# Patient Record
Sex: MALE | Race: WHITE | NOT HISPANIC OR LATINO | Employment: FULL TIME | URBAN - METROPOLITAN AREA
[De-identification: names, ages, dates, MRNs, and addresses within clinical notes are randomized per-mention and may not be internally consistent; named-entity substitution may affect disease eponyms.]

---

## 2022-11-19 PROBLEM — E66.812 CLASS 2 SEVERE OBESITY DUE TO EXCESS CALORIES WITH SERIOUS COMORBIDITY AND BODY MASS INDEX (BMI) OF 36.0 TO 36.9 IN ADULT: Status: ACTIVE | Noted: 2022-11-19

## 2024-09-23 ENCOUNTER — TELEMEDICINE (OUTPATIENT)
Dept: BEHAVIORAL HEALTH | Facility: CLINIC | Age: 29
End: 2024-09-23
Payer: COMMERCIAL

## 2024-09-23 DIAGNOSIS — F84.5 ASPERGER SYNDROME: ICD-10-CM

## 2024-09-23 DIAGNOSIS — F33.1 MDD (MAJOR DEPRESSIVE DISORDER), RECURRENT EPISODE, MODERATE: Primary | ICD-10-CM

## 2024-09-23 DIAGNOSIS — R41.840 CONCENTRATION DEFICIT: ICD-10-CM

## 2024-09-23 PROCEDURE — 96127 BRIEF EMOTIONAL/BEHAV ASSMT: CPT

## 2024-09-23 PROCEDURE — 99214 OFFICE O/P EST MOD 30 MIN: CPT

## 2024-09-23 RX ORDER — BUPROPION HYDROCHLORIDE 150 MG/1
150 TABLET ORAL EVERY MORNING
Qty: 3 TABLET | Refills: 0 | Status: SHIPPED | OUTPATIENT
Start: 2024-09-23 | End: 2024-09-26

## 2024-09-23 RX ORDER — BUPROPION HYDROCHLORIDE 300 MG/1
300 TABLET ORAL EVERY MORNING
Qty: 42 TABLET | Refills: 0 | Status: SHIPPED | OUTPATIENT
Start: 2024-09-23

## 2024-10-21 ENCOUNTER — OFFICE VISIT (OUTPATIENT)
Dept: FAMILY MEDICINE CLINIC | Facility: CLINIC | Age: 29
End: 2024-10-21
Payer: COMMERCIAL

## 2024-10-21 VITALS
RESPIRATION RATE: 18 BRPM | WEIGHT: 254.1 LBS | DIASTOLIC BLOOD PRESSURE: 82 MMHG | SYSTOLIC BLOOD PRESSURE: 130 MMHG | HEIGHT: 69 IN | HEART RATE: 67 BPM | BODY MASS INDEX: 37.64 KG/M2 | OXYGEN SATURATION: 96 %

## 2024-10-21 DIAGNOSIS — E66.812 CLASS 2 SEVERE OBESITY DUE TO EXCESS CALORIES WITH SERIOUS COMORBIDITY AND BODY MASS INDEX (BMI) OF 36.0 TO 36.9 IN ADULT: ICD-10-CM

## 2024-10-21 DIAGNOSIS — B35.3 ATHLETE'S FOOT ON LEFT: Primary | ICD-10-CM

## 2024-10-21 DIAGNOSIS — E66.01 CLASS 2 SEVERE OBESITY DUE TO EXCESS CALORIES WITH SERIOUS COMORBIDITY AND BODY MASS INDEX (BMI) OF 36.0 TO 36.9 IN ADULT: ICD-10-CM

## 2024-10-21 DIAGNOSIS — G47.33 OSA ON CPAP: ICD-10-CM

## 2024-10-21 RX ORDER — TRIAMCINOLONE ACETONIDE 1 MG/G
1 OINTMENT TOPICAL 2 TIMES DAILY
Qty: 15 G | Refills: 1 | Status: SHIPPED | OUTPATIENT
Start: 2024-10-21

## 2024-10-21 RX ORDER — KETOCONAZOLE 20 MG/G
1 CREAM TOPICAL DAILY
Qty: 15 G | Refills: 1 | Status: SHIPPED | OUTPATIENT
Start: 2024-10-21
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
## 2024-10-21 NOTE — ASSESSMENT & PLAN NOTE
Patient's (Body mass index is 37.52 kg/m².) indicates that they are morbidly/severely obese (BMI > 40 or > 35 with obesity - related health condition) with health conditions that include obstructive sleep apnea . Weight is unchanged. BMI  is above average; BMI management plan is completed. We discussed portion control, increasing exercise, joining a fitness center or start home based exercise program, and an abisai-based approach such as [employer] or Lose It.

## 2024-10-24 ENCOUNTER — TELEPHONE (OUTPATIENT)
Dept: FAMILY MEDICINE CLINIC | Facility: CLINIC | Age: 29
End: 2024-10-24

## 2024-10-24 NOTE — TELEPHONE ENCOUNTER
Pt taking wellbutrin, will be filled as normal by covering provider until pt can get an appointment. Pt aware

## 2024-11-03 DIAGNOSIS — F33.1 MDD (MAJOR DEPRESSIVE DISORDER), RECURRENT EPISODE, MODERATE: ICD-10-CM

## 2024-11-03 DIAGNOSIS — R41.840 CONCENTRATION DEFICIT: ICD-10-CM

## 2024-11-03 DIAGNOSIS — F84.5 ASPERGER SYNDROME: ICD-10-CM

## 2024-11-04 NOTE — TELEPHONE ENCOUNTER
LAST REFILL - 09/23/24  LAST VISIT - 09/23/24  NEXT VISIT - not scheduled (upcoming appt with new psych 11/15/24)    Routing to covering provider Dr. Headley due to Sachin Reinoso offboarding     3

## 2024-11-05 RX ORDER — BUPROPION HYDROCHLORIDE 300 MG/1
300 TABLET ORAL EVERY MORNING
Qty: 30 TABLET | Refills: 0 | Status: SHIPPED | OUTPATIENT
Start: 2024-11-05

## 2024-11-15 ENCOUNTER — OFFICE VISIT (OUTPATIENT)
Age: 29
End: 2024-11-15
Payer: COMMERCIAL

## 2024-11-15 VITALS
DIASTOLIC BLOOD PRESSURE: 80 MMHG | HEIGHT: 69 IN | OXYGEN SATURATION: 95 % | BODY MASS INDEX: 37.12 KG/M2 | SYSTOLIC BLOOD PRESSURE: 119 MMHG | HEART RATE: 77 BPM | WEIGHT: 250.6 LBS

## 2024-11-15 DIAGNOSIS — F39 MOOD DISORDER: Primary | ICD-10-CM

## 2024-11-15 DIAGNOSIS — F41.1 GAD (GENERALIZED ANXIETY DISORDER): ICD-10-CM

## 2024-11-15 DIAGNOSIS — F43.10 POST TRAUMATIC STRESS DISORDER (PTSD): ICD-10-CM

## 2024-11-15 DIAGNOSIS — F84.5 ASPERGER SYNDROME: ICD-10-CM

## 2024-11-15 DIAGNOSIS — R41.840 CONCENTRATION DEFICIT: ICD-10-CM
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
## 2024-12-10 ENCOUNTER — OFFICE VISIT (OUTPATIENT)
Age: 29
End: 2024-12-10
Payer: COMMERCIAL

## 2024-12-10 VITALS
BODY MASS INDEX: 37.47 KG/M2 | HEIGHT: 69 IN | OXYGEN SATURATION: 83 % | DIASTOLIC BLOOD PRESSURE: 82 MMHG | WEIGHT: 253 LBS | HEART RATE: 83 BPM | SYSTOLIC BLOOD PRESSURE: 131 MMHG

## 2024-12-10 DIAGNOSIS — F31.30 BIPOLAR I DISORDER, MOST RECENT EPISODE DEPRESSED: Primary | ICD-10-CM
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
## 2024-12-17 ENCOUNTER — OFFICE VISIT (OUTPATIENT)
Age: 29
End: 2024-12-17
Payer: COMMERCIAL

## 2024-12-17 VITALS
WEIGHT: 254 LBS | DIASTOLIC BLOOD PRESSURE: 74 MMHG | SYSTOLIC BLOOD PRESSURE: 121 MMHG | HEART RATE: 89 BPM | BODY MASS INDEX: 37.62 KG/M2 | OXYGEN SATURATION: 96 % | HEIGHT: 69 IN

## 2024-12-17 DIAGNOSIS — F51.05 INSOMNIA DUE TO OTHER MENTAL DISORDER: ICD-10-CM

## 2024-12-17 DIAGNOSIS — F31.30 BIPOLAR I DISORDER, MOST RECENT EPISODE DEPRESSED: Primary | ICD-10-CM

## 2024-12-17 DIAGNOSIS — F99 INSOMNIA DUE TO OTHER MENTAL DISORDER: ICD-10-CM

## 2024-12-17 RX ORDER — LUMATEPERONE 42 MG/1
1 CAPSULE ORAL
Qty: 21 CAPSULE | Refills: 0 | COMMUNITY
Start: 2024-12-17 | End: 2025-01-07
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
## 2024-12-21 ENCOUNTER — TELEMEDICINE (OUTPATIENT)
Dept: FAMILY MEDICINE CLINIC | Facility: TELEHEALTH | Age: 29
End: 2024-12-21
Payer: COMMERCIAL

## 2024-12-21 DIAGNOSIS — J32.9 SINUSITIS, UNSPECIFIED CHRONICITY, UNSPECIFIED LOCATION: Primary | ICD-10-CM

## 2024-12-21 NOTE — PROGRESS NOTES
You have chosen to receive care through a telehealth visit.  Do you consent to use a video/audio connection for your medical care today? Yes     HPI  History of Present Illness  29 year old male with 3 weeks h/o chest congestion, cough, sinus pressure and ear fullness. He reports over the course of 3 weeks he has had a steroid shot and Amoxicillin and was feeling better but he stopped taking the Amoxicillin when he lost the medication. Now he is worse than he was before and is requesting a new script. He is taking OTC medications for cough.     Review of Systems   Constitutional:  Negative for chills, fatigue and fever.   HENT:  Positive for congestion, ear pain (bilateral ear fullness), postnasal drip, rhinorrhea, sinus pressure and sinus pain.    Respiratory:  Positive for cough.    Cardiovascular: Negative.    Gastrointestinal: Negative.    Musculoskeletal: Negative.    Skin: Negative.    Neurological: Negative.    Hematological: Negative.        Past Medical History:   Diagnosis Date    ADHD (attention deficit hyperactivity disorder) 2001    Anxiety 2006    Asperger syndrome     Age 18    Depression 2006    H/O psychiatric hospitalization     [facility]    History of medical problems Trichotillomania, pilonidal cyst, aspbergers    JHOAN (obstructive sleep apnea)     PTSD (post-traumatic stress disorder)     Suicide attempt     Age 12 & 16       Family History   Problem Relation Age of Onset    Depression Mother         estranged    Anxiety disorder Mother     Alcohol abuse Mother     Depression Father     Anxiety disorder Father        Social History     Socioeconomic History    Marital status:      Spouse name: Rogelio    Number of children: 0    Highest education level: GED or equivalent   Tobacco Use    Smoking status: Never    Smokeless tobacco: Never   Vaping Use    Vaping status: Never Used   Substance and Sexual Activity    Alcohol use: Not Currently     Alcohol/week: 1.0 standard  drink of alcohol     Types: 1 Standard drinks or equivalent per week     Comment: SOICALLY    Drug use: Never    Sexual activity: Yes     Partners: Male     Birth control/protection: Partner of same sex         There were no vitals taken for this visit.    PHYSICAL EXAM  Physical Exam   Constitutional: He is oriented to person, place, and time. He appears well-developed and well-nourished. He does not have a sickly appearance. He does not appear ill. No distress.   HENT:   Head: Normocephalic and atraumatic.   Right Ear: Hearing normal.   Left Ear: Hearing normal.   Nose: Rhinorrhea and congestion present. Right sinus exhibits maxillary sinus tenderness. Right sinus exhibits no frontal sinus tenderness. Left sinus exhibits maxillary sinus tenderness. Left sinus exhibits no frontal sinus tenderness.   Mouth/Throat: Mouth/Lips are normal.  Pulmonary/Chest: Effort normal.  No respiratory distress.  Neurological: He is alert and oriented to person, place, and time.   Psychiatric: He has a normal mood and affect.   Vitals reviewed.    Physical Exam      Diagnoses and all orders for this visit:    1. Sinusitis, unspecified chronicity, unspecified location (Primary)  -     amoxicillin-clavulanate (AUGMENTIN) 875-125 MG per tablet; Take 1 tablet by mouth 2 (Two) Times a Day.  Dispense: 20 tablet; Refill: 0      Assessment & Plan  Increase fluids and rest  Flonase nasal spray as directed prn nasal congestion  Mucinex  mg po every 12 hours prn cough and congestion.       FOLLOW-UP  As discussed during visit with [facility], if symptoms worsen or fail to improve, follow-up with PCP/Urgent Care/Emergency Department.    Patient verbalizes understanding of medications, instructions for treatment and follow-up.    Patient or patient representative verbalized consent for the use of Ambient Listening during the visit with  DAVID Cid for chart documentation. 12/21/2024  18:50 EST    Beatrice Saul  APRN  12/21/2024  18:49 EST    The use of a video visit has been reviewed with the patient and verbal informed consent has been obtained. Myself and Anurag Esparza participated in this visit. The patient is located in [place], [place] and I am located in [place], [place]. [employer] and [employer]  were utilized.

## 2025-01-07 DIAGNOSIS — F31.30 BIPOLAR I DISORDER, MOST RECENT EPISODE DEPRESSED: ICD-10-CM

## 2025-01-07 DIAGNOSIS — F51.05 INSOMNIA DUE TO OTHER MENTAL DISORDER: ICD-10-CM

## 2025-01-07 DIAGNOSIS — F99 INSOMNIA DUE TO OTHER MENTAL DISORDER: ICD-10-CM

## 2025-01-07 RX ORDER — LUMATEPERONE 42 MG/1
1 CAPSULE ORAL
Qty: 21 CAPSULE | Refills: 0 | Status: CANCELLED | COMMUNITY
Start: 2025-01-07 | End: 2025-01-28

## 2025-01-07 NOTE — TELEPHONE ENCOUNTER
Rx Refill Note  Requested Prescriptions     Pending Prescriptions Disp Refills    Lumateperone Tosylate (Caplyta) 42 MG capsule 21 capsule 0     Sig: Take 1 capsule by mouth every night at bedtime for 21 days. Lot # [id number]      Last office visit with prescribing clinician: 12/17/2024   Last telemedicine visit with prescribing clinician: Visit date not found   Next office visit with prescribing clinician: 1/14/2025     Armand Mcgowan MA  01/07/25, 10:33 EST

## 2025-01-08 DIAGNOSIS — F99 INSOMNIA DUE TO OTHER MENTAL DISORDER: ICD-10-CM

## 2025-01-08 DIAGNOSIS — F31.30 BIPOLAR I DISORDER, MOST RECENT EPISODE DEPRESSED: ICD-10-CM

## 2025-01-08 DIAGNOSIS — F51.05 INSOMNIA DUE TO OTHER MENTAL DISORDER: ICD-10-CM

## 2025-01-08 RX ORDER — LUMATEPERONE 42 MG/1
1 CAPSULE ORAL
Qty: 30 CAPSULE | Refills: 2 | Status: SHIPPED | OUTPATIENT
Start: 2025-01-08 | End: 2025-04-08

## 2025-01-21 ENCOUNTER — OFFICE VISIT (OUTPATIENT)
Age: 30
End: 2025-01-21
Payer: COMMERCIAL

## 2025-01-21 VITALS
SYSTOLIC BLOOD PRESSURE: 124 MMHG | HEART RATE: 75 BPM | HEIGHT: 69 IN | OXYGEN SATURATION: 92 % | BODY MASS INDEX: 37.33 KG/M2 | WEIGHT: 252 LBS | DIASTOLIC BLOOD PRESSURE: 87 MMHG

## 2025-01-21 DIAGNOSIS — F31.30 BIPOLAR I DISORDER, MOST RECENT EPISODE DEPRESSED: Primary | ICD-10-CM

## 2025-01-21 DIAGNOSIS — F99 INSOMNIA DUE TO OTHER MENTAL DISORDER: ICD-10-CM

## 2025-01-21 DIAGNOSIS — F41.1 GAD (GENERALIZED ANXIETY DISORDER): ICD-10-CM

## 2025-01-21 DIAGNOSIS — F43.10 POST TRAUMATIC STRESS DISORDER (PTSD): ICD-10-CM

## 2025-01-21 DIAGNOSIS — F51.05 INSOMNIA DUE TO OTHER MENTAL DISORDER: ICD-10-CM

## 2025-01-21 RX ORDER — LUMATEPERONE 42 MG/1
1 CAPSULE ORAL
Qty: 21 CAPSULE | Refills: 0 | COMMUNITY
Start: 2025-01-21 | End: 2025-02-11

## 2025-01-21 NOTE — PROGRESS NOTES
"     Office  Follow Up Visit      Patient Name: Anurag Esparza  : 1995   MRN: [id number]     Referring Provider: Karen Ibarra APRN    Chief Complaint:  \"management of bipolar disorder, insomnia, anxiety, and PTSD\"      ICD-10-CM ICD-9-CM   1. Bipolar I disorder, most recent episode depressed  F31.30 296.50   2. Insomnia due to other mental disorder  F51.05 300.9    F99 327.02   3. ALICIA (generalized anxiety disorder)  F41.1 300.02   4. Post traumatic stress disorder (PTSD)  F43.10 309.81        History of Present Illness:   Anurag Esparza is a 29 y.o. male presenting for a routine follow up appointment for psychiatric medication management. Patient reports discontinuation of Caplyta for the past two weeks due to insurance issues, compounded by a snowstorm and communication delays with the office. Prior to discontinuation, he had been taking Caplyta for three weeks and noted positive effects including improved mood, feeling more , better sleep patterns, increased attentiveness, and reduced irritability. Patient experienced difficulty obtaining a refill due to insurance requiring prior authorization, resulting in a lapse in medication.     Patient reports irregular sleep patterns. On weekends, bedtime is typically 2-3 AM. When taking Caplyta, he attempts to take it around 9 PM and be in bed by 10:30-11 PM, unless caffeine has been consumed, which interferes with sleep. Without medication, patient reports going to bed around 2-3 AM, waking at 9 AM, then returning to sleep until 11 AM-12 PM. Patient notes difficulty falling asleep.    Subjective      Review of Systems:   Ears, Nose, Mouth, & Throat: Denies difficulty hearing, smelling, sinus problems, sore throat, or dentition.  Heart/Vascular: Denies rapid heart rate, chest pain, swelling of feet or legs   Respiratory: Denies shortness of breath, cough, or wheeze  GI/: Denies nausea, vomiting, constipation, diarrhea, abdominal pain, painful " urination, frequent urination  MSK: Denies swelling or joint deformities  Skin: Denies lesions or rash  Neurologic: Denies headaches, dizziness, or tremor   Endocrine: Denies heat or cold intolerance   Hematologic: Denies easy bruising or bleeding  Psychiatric: Endorses insomnia, irritability, depression, anxiety; denies recurrent bad thoughts, mood swings, hallucinations, compulsions    PHQ-9 Depression Screening  Little interest or pleasure in doing things?     Feeling down, depressed, or hopeless?     PHQ-2 Total Score     Trouble falling or staying asleep, or sleeping too much?     Feeling tired or having little energy?     Poor appetite or overeating?     Feeling bad about yourself - or that you are a failure or have let yourself or your family down?     Trouble concentrating on things, such as reading the newspaper or watching television?     Moving or speaking so slowly that other people could have noticed? Or the opposite - being so fidgety or restless that you have been moving around a lot more than usual?     Thoughts that you would be better off dead, or of hurting yourself in some way?     PHQ-9 Total Score     If you checked off any problems, how difficult have these problems made it for you to do your work, take care of things at home, or get along with other people?           ALICIA-7           Patient History:   The following portions of the patient's history were reviewed and updated as appropriate: allergies, current medications, past family history, past medical history, past social history, past surgical history and problem list.     Social History     Socioeconomic History    Marital status:      Spouse name: Rogelio    Number of children: 0    Highest education level: GED or equivalent   Tobacco Use    Smoking status: Never    Smokeless tobacco: Never   Vaping Use    Vaping status: Never Used   Substance and Sexual Activity    Alcohol use: Not Currently     Alcohol/week: 1.0 standard drink  "of alcohol     Types: 1 Standard drinks or equivalent per week     Comment: SOICALLY    Drug use: Never    Sexual activity: Yes     Partners: Male     Birth control/protection: Partner of same sex       Family History   Problem Relation Age of Onset    Depression Mother         estranged    Anxiety disorder Mother     Alcohol abuse Mother     Depression Father     Anxiety disorder Father        Medications:     Current Outpatient Medications:     Lumateperone Tosylate (Caplyta) 42 MG capsule, Take 1 capsule by mouth every night at bedtime for 21 days. Lot # [id number] EXP: 3/2027, Disp: 21 capsule, Rfl: 0    amoxicillin-clavulanate (AUGMENTIN) 875-125 MG per tablet, Take 1 tablet by mouth 2 (Two) Times a Day., Disp: 20 tablet, Rfl: 0    ketoconazole (NIZORAL) 2 % cream, Apply 1 Application topically to the appropriate area as directed Daily., Disp: 15 g, Rfl: 1    triamcinolone (KENALOG) 0.1 % ointment, Apply 1 Application topically to the appropriate area as directed 2 (Two) Times a Day., Disp: 15 g, Rfl: 1    Objective     Physical Exam:  Vital Signs:   Vitals:    01/21/25 0831   BP: 124/87   Pulse: 75   SpO2: 92%   Weight: 114 kg (252 lb)   Height: 175.3 cm (69.02\")     Body mass index is 37.2 kg/m².     Mental Status Exam:   The patient was alert and oriented to time, place, person, and situation. Neatly groomed and dressed. Good eye contact. Cooperative and answers questions willingly. No evidence of gait imbalance or shuffling. Normal speech rate, rhythm, and tone. Reports depressed mood. Affect congruent with mood. Denies SI/HI/AVH. Organized thought process. No evidence of delusional thinking.  Fair insight, fair judgement. Impulse control intact. Memory intact with average to above average intellectual functioning.     No Known Allergies     Current Medications:   Current Outpatient Medications   Medication Sig Dispense Refill    Lumateperone Tosylate (Caplyta) 42 MG capsule Take 1 capsule by mouth every night " at bedtime for 21 days. Lot # [id number]  EXP: 3/2027 21 capsule 0    amoxicillin-clavulanate (AUGMENTIN) 875-125 MG per tablet Take 1 tablet by mouth 2 (Two) Times a Day. 20 tablet 0    ketoconazole (NIZORAL) 2 % cream Apply 1 Application topically to the appropriate area as directed Daily. 15 g 1    triamcinolone (KENALOG) 0.1 % ointment Apply 1 Application topically to the appropriate area as directed 2 (Two) Times a Day. 15 g 1     No current facility-administered medications for this visit.       @RESULASTCBCDIFFPANEL,TSH,LABLIPI,[id number],IWQRUPNY01,MG,FOLATE,PROLACTIN,CRPRESULT,CMP,[id number])@    Lab Results   Component Value Date    GLUCOSE 93 03/04/2024    BUN 13 03/04/2024    CREATININE 0.82 03/04/2024    EGFRRESULT 122.7 03/04/2024    BCR 15.9 03/04/2024    K 4.4 03/04/2024    CO2 24.5 03/04/2024    CALCIUM 8.9 03/04/2024    PROTENTOTREF 6.5 03/04/2024    ALBUMIN 4.5 03/04/2024    BILITOT 0.5 03/04/2024    AST 21 03/04/2024    ALT 26 03/04/2024       Lab Results   Component Value Date    WBC 10.22 03/04/2024    HGB 14.5 03/04/2024    HCT 43.9 03/04/2024    MCV 85.7 03/04/2024     03/04/2024       Lab Results   Component Value Date    CHLPL 202 (H) 03/04/2024    TRIG 129 03/04/2024    HDL 39 (L) 03/04/2024     (H) 03/04/2024                  Assessment / Plan      Impression/Treatment Plan:  The patient reported discontinuation of Caplyta for the past two weeks due to insurance issues, which led to a lapse in medication. Prior to discontinuation, he experienced positive effects from Caplyta, including improved mood, sleep, and attentiveness. He also reported irregular sleep patterns, increased stress due to their pet's health issues, and overeating as a coping mechanism. The plan includes providing Caplyta samples, working on prior authorization, encouraging a consistent sleep schedule, and addressing stress. A follow-up appointment is scheduled in two weeks.    1.  Bipolar disorder     -  Patient reported positive effects from Caplyta, including improved mood, sleep, and attentiveness.     - Patient experienced a gap in medication due to insurance issues and prior authorization requirements.     Plan:         a. Provide patient with 3 weeks' worth of Caplyta samples.         b. Work on obtaining prior authorization for Caplyta from the patient's insurance ([employer]).         c. Provide patient with a coupon card for Caplyta to reduce out-of-pocket costs.         d. Schedule a follow-up appointment in 2 weeks to assess the patient's progress on Caplyta.    2. Sleep disturbances     - Patient reports difficulty falling asleep and inconsistent sleep schedule, exacerbated by caffeine consumption.     Plan:         a. Encourage patient to maintain a consistent sleep schedule and bedtime routine.         b. Advise patient to avoid caffeine close to bedtime.         c. Monitor sleep quality during the follow-up appointment in 2 weeks.    3.  Anxiety     - Patient reports increased stress due to pet's health issues, requiring multiple vet visits, and disruption of daily routine.     Plan:         a. Encourage patient to engage in stress-reducing activities, such as exercise, meditation, or hobbies.         b. Assess patient's stress levels during the follow-up appointment in 2 weeks.        Diagnoses and all orders for this visit:    1. Bipolar I disorder, most recent episode depressed (Primary)  -     Lumateperone Tosylate (Caplyta) 42 MG capsule; Take 1 capsule by mouth every night at bedtime for 21 days. Lot # [id number]  EXP: 3/2027  Dispense: 21 capsule; Refill: 0    2. Insomnia due to other mental disorder  -     Lumateperone Tosylate (Caplyta) 42 MG capsule; Take 1 capsule by mouth every night at bedtime for 21 days. Lot # [id number]  EXP: 3/2027  Dispense: 21 capsule; Refill: 0    3. ALICIA (generalized anxiety disorder)  -     Lumateperone Tosylate (Caplyta) 42 MG capsule; Take 1 capsule by mouth  every night at bedtime for 21 days. Lot # [id number]  EXP: 3/2027  Dispense: 21 capsule; Refill: 0    4. Post traumatic stress disorder (PTSD)  -     Lumateperone Tosylate (Caplyta) 42 MG capsule; Take 1 capsule by mouth every night at bedtime for 21 days. Lot # [id number]  EXP: 3/2027  Dispense: 21 capsule; Refill: 0        MEDS ORDERED DURING VISIT:  New Medications Ordered This Visit   Medications    Lumateperone Tosylate (Caplyta) 42 MG capsule     Sig: Take 1 capsule by mouth every night at bedtime for 21 days. Lot # [id number]  EXP: 3/2027     Dispense:  21 capsule     Refill:  0     Order Specific Question:   Lot Number?     Answer:   [id number]     Order Specific Question:   Expiration Date?     Answer:   1/20/2027     Order Specific Question:   Quantity     Answer:   10       Follow up with Yulia Figueroa, PAIGE, APRN, PMERNAP-BC in this office in 2 weeks.          PATIENT EDUCATION:  Discussed medication options and treatment plan of prescribed medication(s) as well as the risks, benefits, and potential side effects. Patient is agreeable to call the office with any worsening of symptoms or onset of side effects. Patient is agreeable to call 911 or go to the nearest ER should he/she begin having SI/HI.    SAFETY PLAN:  Patient was given ample time for questions and fully participated in treatment planning.  Patient was encouraged to call the clinic with any questions or concerns.  Patient was informed of access to emergency care. If patient were to develop any significant symptomatology, suicidal ideation, homicidal ideation, any concerns, or feel unsafe at any time they are to call the clinic and if unable to get immediate assistance should immediately call 911 or go to the nearest emergency room.  The patient is advised to remove or secure (lock away) all lethal weapons (including guns) and sharps (including razors, scissors, knives, etc.).  All medications (including any prescribed and any over the counter medications)  should be stored in a safe and secured location that is not obtainable by children/adolescents.  Patient was given an opportunity and encouraged to ask questions about their medication, illness, and treatment. Patient contracted verbally for the following: If you are experiencing an emotional crisis or have thoughts of harming yourself or others, please go to your nearest local emergency room or call 911. Will continue to re-assess medication response and side effects frequently to establish efficacy and ensure safety. Risks, any black box warnings, side effects, off label usage, and benefits of medication and treatment discussed with patient, along with potential adverse side effects of current and/or newly prescribed medication, alternative treatment options, and OTC medications.  Patient verbalized understanding of potential risks, any off label use of medication, any black box warnings, and any side effects in their own words. The patient verbalized understanding and agreed to comply with the safety plan discussed in their own words.  Patient given the number to the office. Number also available to the 24- hour suicide hotline.     Short Term Goals: Continue building rapport with the patient. Patient will be compliant with medication, and patient will have no significant medication related side effects.  Patient will be engaged in psychotherapy as indicated.  Patient will report subjective improvement of symptoms.     Long term goals: To stabilize mood and treat/improve subjective symptoms, the patient will stay out of the hospital, the patient will be at an optimal level of functioning, and the patient will take all medications as prescribed.     Poli reviewed and is appropriate.    Copied text in this note has been reviewed and is accurate as of 1/21/2025.    Part of this note may be an electronic transcription/translation of spoken language to printed text using the Dragon Dictation System.    Yulia  Henry, PAIGE, APRN, PMHNP-BC

## 2025-02-11 ENCOUNTER — OFFICE VISIT (OUTPATIENT)
Age: 30
End: 2025-02-11
Payer: COMMERCIAL

## 2025-02-11 VITALS
HEIGHT: 69 IN | WEIGHT: 250 LBS | BODY MASS INDEX: 37.03 KG/M2 | DIASTOLIC BLOOD PRESSURE: 81 MMHG | SYSTOLIC BLOOD PRESSURE: 126 MMHG | HEART RATE: 74 BPM | OXYGEN SATURATION: 92 %

## 2025-02-11 DIAGNOSIS — R41.840 CONCENTRATION DEFICIT: ICD-10-CM

## 2025-02-11 DIAGNOSIS — F43.10 POST TRAUMATIC STRESS DISORDER (PTSD): ICD-10-CM

## 2025-02-11 DIAGNOSIS — F84.5 ASPERGER SYNDROME: ICD-10-CM

## 2025-02-11 DIAGNOSIS — F31.30 BIPOLAR I DISORDER, MOST RECENT EPISODE DEPRESSED: ICD-10-CM

## 2025-02-11 DIAGNOSIS — F41.1 GAD (GENERALIZED ANXIETY DISORDER): Primary | ICD-10-CM

## 2025-02-11 DIAGNOSIS — F99 INSOMNIA DUE TO OTHER MENTAL DISORDER: ICD-10-CM

## 2025-02-11 DIAGNOSIS — F51.05 INSOMNIA DUE TO OTHER MENTAL DISORDER: ICD-10-CM

## 2025-02-11 RX ORDER — LUMATEPERONE 42 MG/1
1 CAPSULE ORAL
Qty: 30 CAPSULE | Refills: 2 | Status: SHIPPED | OUTPATIENT
Start: 2025-02-11 | End: 2025-05-12

## 2025-02-11 NOTE — PROGRESS NOTES
"     Office  Follow Up Visit      Patient Name: Anurag Esparza  : 1995   MRN: [id number]     Referring Provider: Karen Ibarra APRN    Chief Complaint: \"Management of mood disorder, generalized anxiety disorder, PTSD, insomnia, ADHD\"    ICD-10-CM ICD-9-CM   1. ALICIA (generalized anxiety disorder)  F41.1 300.02   2. Post traumatic stress disorder (PTSD)  F43.10 309.81   3. Asperger syndrome  F84.5 299.80   4. Concentration deficit  R41.840 799.51   5. Insomnia due to other mental disorder  F51.05 300.9    F99 327.02   6. Bipolar I disorder, most recent episode depressed  F31.30 296.50        History of Present Illness:   Anurag Esparza is a 29 y.o. male presenting for a routine follow up appointment for psychiatric medication management.        The patient is a 29-year-old male presenting today for a routine follow-up appointment for psychiatric medication management.    He reports a positive response to Caplyta, which he has been taking for the past 3 weeks. However, he has experienced an increase in appetite, leading to a weight gain of approximately 5 to 10 pounds. He expresses concern about potential anxiety-related overeating and plans to enlist his partner's support in managing his food intake. He also reports frequent urination at night, which disrupts his sleep. He has increased his fluid intake due to dry mouth, a known side effect of Caplyta. He has previously undergone neuropsychological testing, which resulted in diagnoses of Asperger's, ADHD, mood disorder, and anxiety. He has expressed interest in resuming ADHD medication, having previously tried Vyvanse and Adderall, both of which he found beneficial.    MEDICATIONS  Current: Caplyta  Past: Seroquel, aripiprazole, risperidone, sertraline, clonidine,  Wellbutrin, lithium     Subjective      Review of Systems:   Ears, Nose, Mouth, & Throat: Denies difficulty hearing, smelling, sinus problems, sore throat, or dentition.  Heart/Vascular: Denies " rapid heart rate, chest pain, swelling of feet or legs   Respiratory: Denies shortness of breath, cough, or wheeze  GI/: Denies nausea, vomiting, constipation, diarrhea, abdominal pain, painful urination, frequent urination  MSK: Denies swelling or joint deformities  Skin: Denies lesions or rash  Neurologic: Denies headaches, dizziness, or tremor   Endocrine: Denies heat or cold intolerance   Hematologic: Denies easy bruising or bleeding  Psychiatric: Endorses improvement in insomnia, irritability, depression, anxiety; denies recurrent bad thoughts, mood swings, hallucinations, compulsions    PHQ-9 Depression Screening  Little interest or pleasure in doing things? Several days   Feeling down, depressed, or hopeless? Several days   PHQ-2 Total Score 2   Trouble falling or staying asleep, or sleeping too much? Not at all   Feeling tired or having little energy? Over half   Poor appetite or overeating? Almost all   Feeling bad about yourself - or that you are a failure or have let yourself or your family down? Several days   Trouble concentrating on things, such as reading the newspaper or watching television? Almost all   Moving or speaking so slowly that other people could have noticed? Or the opposite - being so fidgety or restless that you have been moving around a lot more than usual? Not at all   Thoughts that you would be better off dead, or of hurting yourself in some way? Not at all   PHQ-9 Total Score 11   If you checked off any problems, how difficult have these problems made it for you to do your work, take care of things at home, or get along with other people? Somewhat difficult         ALICIA-7      Over the last two weeks, how often have you been bothered by the following problems?  Feeling nervous, anxious or on edge: Several days  Not being able to stop or control worrying: Several days  Worrying too much about different things: More than half the days  Trouble Relaxing: More than half the days  Being  so restless that it is hard to sit still: More than half the days  Becoming easily annoyed or irritable: Several days  Feeling afraid as if something awful might happen: Several days  ALICIA 7 Total Score: 10  If you checked any problems, how difficult have these problems made it for you to do your work, take care of things at home, or get along with other people: Somewhat difficult    Patient History:   The following portions of the patient's history were reviewed and updated as appropriate: allergies, current medications, past family history, past medical history, past social history, past surgical history and problem list.     Social History     Socioeconomic History    Marital status:      Spouse name: Rogelio    Number of children: 0    Highest education level: GED or equivalent   Tobacco Use    Smoking status: Never    Smokeless tobacco: Never   Vaping Use    Vaping status: Never Used   Substance and Sexual Activity    Alcohol use: Not Currently     Alcohol/week: 1.0 standard drink of alcohol     Types: 1 Standard drinks or equivalent per week     Comment: SOICALLY    Drug use: Never    Sexual activity: Yes     Partners: Male     Birth control/protection: Partner of same sex       Family History   Problem Relation Age of Onset    Depression Mother         estranged    Anxiety disorder Mother     Alcohol abuse Mother     Depression Father     Anxiety disorder Father        Medications:     Current Outpatient Medications:     Lumateperone Tosylate (Caplyta) 42 MG capsule, Take 1 capsule by mouth every night at bedtime for 90 days. Lot # [id number] EXP: 3/2027, Disp: 30 capsule, Rfl: 2    amoxicillin-clavulanate (AUGMENTIN) 875-125 MG per tablet, Take 1 tablet by mouth 2 (Two) Times a Day., Disp: 20 tablet, Rfl: 0    azithromycin (ZITHROMAX) 250 MG tablet, Take 2 on first day. Take 1 a day for additional 4 days., Disp: 6 tablet, Rfl: 0    benzonatate (TESSALON) 200 MG capsule, Take 1 capsule by mouth 3 (Three)  "Times a Day As Needed for Cough., Disp: 15 capsule, Rfl: 0    ketoconazole (NIZORAL) 2 % cream, Apply 1 Application topically to the appropriate area as directed Daily., Disp: 15 g, Rfl: 1    triamcinolone (KENALOG) 0.1 % ointment, Apply 1 Application topically to the appropriate area as directed 2 (Two) Times a Day., Disp: 15 g, Rfl: 1    Objective     Physical Exam:  Vital Signs:   Vitals:    02/11/25 1324   BP: 126/81   Pulse: 74   SpO2: 92%   Weight: 113 kg (250 lb)   Height: 175.3 cm (69.02\")     Body mass index is 36.9 kg/m².     Mental Status Exam:   The patient was alert and oriented to time, place, person, and situation. Neatly groomed and dressed. Good eye contact. Cooperative and answers questions willingly. No evidence of gait imbalance or shuffling. Normal speech rate, rhythm, and tone. Reports euthymic mood. Affect congruent with mood.  Denies SI/HI/AVH. Organized thought process. No evidence of delusional thinking.  Fair insight, fair judgement. Impulse control intact. Memory intact with average to above average intellectual functioning.     No Known Allergies     Current Medications:   Current Outpatient Medications   Medication Sig Dispense Refill    Lumateperone Tosylate (Caplyta) 42 MG capsule Take 1 capsule by mouth every night at bedtime for 90 days. Lot # [id number] EXP: 3/2027 30 capsule 2    amoxicillin-clavulanate (AUGMENTIN) 875-125 MG per tablet Take 1 tablet by mouth 2 (Two) Times a Day. 20 tablet 0    azithromycin (ZITHROMAX) 250 MG tablet Take 2 on first day. Take 1 a day for additional 4 days. 6 tablet 0    benzonatate (TESSALON) 200 MG capsule Take 1 capsule by mouth 3 (Three) Times a Day As Needed for Cough. 15 capsule 0    ketoconazole (NIZORAL) 2 % cream Apply 1 Application topically to the appropriate area as directed Daily. 15 g 1    triamcinolone (KENALOG) 0.1 % ointment Apply 1 Application topically to the appropriate area as directed 2 (Two) Times a Day. 15 g 1     No current " facility-administered medications for this visit.       @RESULASTCBCDIFFPANEL,TSH,LABLIPI,[id number],[id number],MG,FOLATE,PROLACTIN,CRPRESULT,CMP,[id number])@    Lab Results   Component Value Date    GLUCOSE 93 03/04/2024    BUN 13 03/04/2024    CREATININE 0.82 03/04/2024    EGFRRESULT 122.7 03/04/2024    BCR 15.9 03/04/2024    K 4.4 03/04/2024    CO2 24.5 03/04/2024    CALCIUM 8.9 03/04/2024    PROTENTOTREF 6.5 03/04/2024    ALBUMIN 4.5 03/04/2024    BILITOT 0.5 03/04/2024    AST 21 03/04/2024    ALT 26 03/04/2024       Lab Results   Component Value Date    WBC 10.22 03/04/2024    HGB 14.5 03/04/2024    HCT 43.9 03/04/2024    MCV 85.7 03/04/2024     03/04/2024       Lab Results   Component Value Date    CHLPL 202 (H) 03/04/2024    TRIG 129 03/04/2024    HDL 39 (L) 03/04/2024     (H) 03/04/2024                  Assessment / Plan      Impression/Treatment Plan:       1. Psychiatric medication management.  He reports positive effects from Caplyta but is experiencing increased appetite and weight gain. It was discussed that these side effects might plateau over time. He will continue Caplyta 42 mg daily for another month to monitor if the side effects stabilize. A prescription for Caplyta will be sent to the pharmacy, and prior authorization will be pursued. He is advised to bring his neuropsychological testing report to the next visit or fax to the office sooner for further evaluation and potential initiation of Adderall.    2. Sleep disturbances.  He reports frequent nighttime urination, which may be affecting his sleep. This symptom is not a known side effect of Caplyta. He is advised to follow up with his primary care provider for further evaluation.    Follow-up  The patient will follow up in 1 month.       Diagnoses and all orders for this visit:    1. ALICIA (generalized anxiety disorder) (Primary)  -     Lumateperone Tosylate (Caplyta) 42 MG capsule; Take 1 capsule by mouth every night at bedtime  for 90 days. Lot # [id number] EXP: 3/2027  Dispense: 30 capsule; Refill: 2    2. Post traumatic stress disorder (PTSD)  -     Lumateperone Tosylate (Caplyta) 42 MG capsule; Take 1 capsule by mouth every night at bedtime for 90 days. Lot # [id number] EXP: 3/2027  Dispense: 30 capsule; Refill: 2    3. Asperger syndrome    4. Concentration deficit    5. Insomnia due to other mental disorder  -     Lumateperone Tosylate (Caplyta) 42 MG capsule; Take 1 capsule by mouth every night at bedtime for 90 days. Lot # [id number] EXP: 3/2027  Dispense: 30 capsule; Refill: 2    6. Bipolar I disorder, most recent episode depressed  -     Lumateperone Tosylate (Caplyta) 42 MG capsule; Take 1 capsule by mouth every night at bedtime for 90 days. Lot # [id number] EXP: 3/2027  Dispense: 30 capsule; Refill: 2        MEDS ORDERED DURING VISIT:  New Medications Ordered This Visit   Medications    Lumateperone Tosylate (Caplyta) 42 MG capsule     Sig: Take 1 capsule by mouth every night at bedtime for 90 days. Lot # [id number] EXP: 3/2027     Dispense:  30 capsule     Refill:  2     Order Specific Question:   Lot Number?     Answer:   [id number]     Order Specific Question:   Expiration Date?     Answer:   3/1/2027     Order Specific Question:   Quantity     Answer:   4       Follow up with Yulia Figueroa, PAIGE, APRN, PMHNP-BC in this office in 1 month.          PATIENT EDUCATION:  Discussed medication options and treatment plan of prescribed medication(s) as well as the risks, benefits, and potential side effects. Patient is agreeable to call the office with any worsening of symptoms or onset of side effects. Patient is agreeable to call 911 or go to the nearest ER should he/she begin having SI/HI.    SAFETY PLAN:  Patient was given ample time for questions and fully participated in treatment planning.  Patient was encouraged to call the clinic with any questions or concerns.  Patient was informed of access to emergency care. If patient were to develop any  significant symptomatology, suicidal ideation, homicidal ideation, any concerns, or feel unsafe at any time they are to call the clinic and if unable to get immediate assistance should immediately call 911 or go to the nearest emergency room.  The patient is advised to remove or secure (lock away) all lethal weapons (including guns) and sharps (including razors, scissors, knives, etc.).  All medications (including any prescribed and any over the counter medications) should be stored in a safe and secured location that is not obtainable by children/adolescents.  Patient was given an opportunity and encouraged to ask questions about their medication, illness, and treatment. Patient contracted verbally for the following: If you are experiencing an emotional crisis or have thoughts of harming yourself or others, please go to your nearest local emergency room or call 911. Will continue to re-assess medication response and side effects frequently to establish efficacy and ensure safety. Risks, any black box warnings, side effects, off label usage, and benefits of medication and treatment discussed with patient, along with potential adverse side effects of current and/or newly prescribed medication, alternative treatment options, and OTC medications.  Patient verbalized understanding of potential risks, any off label use of medication, any black box warnings, and any side effects in their own words. The patient verbalized understanding and agreed to comply with the safety plan discussed in their own words.  Patient given the number to the office. Number also available to the 24- hour suicide hotline.     Short Term Goals: Continue building rapport with the patient. Patient will be compliant with medication, and patient will have no significant medication related side effects.  Patient will be engaged in psychotherapy as indicated.  Patient will report subjective improvement of symptoms.     Long term goals: To stabilize  mood and treat/improve subjective symptoms, the patient will stay out of the hospital, the patient will be at an optimal level of functioning, and the patient will take all medications as prescribed.     Poli reviewed and is appropriate.    Patient or patient representative verbalized consent for the use of Ambient Listening during the visit with  DAVID Kerns for chart documentation. 2/11/2025  14:55 EST       Copied text in this note has been reviewed and is accurate as of 2/11/2025.    Part of this note may be an electronic transcription/translation of spoken language to printed text using the Dragon Dictation System.    Yulia Figueroa DNP, DAVID, PMHNP-BC

## 2025-03-19 ENCOUNTER — OFFICE VISIT (OUTPATIENT)
Age: 30
End: 2025-03-19
Payer: COMMERCIAL

## 2025-03-19 VITALS
SYSTOLIC BLOOD PRESSURE: 124 MMHG | BODY MASS INDEX: 36.73 KG/M2 | DIASTOLIC BLOOD PRESSURE: 79 MMHG | HEART RATE: 72 BPM | OXYGEN SATURATION: 97 % | WEIGHT: 248 LBS | HEIGHT: 69 IN

## 2025-03-19 DIAGNOSIS — F90.2 ADHD (ATTENTION DEFICIT HYPERACTIVITY DISORDER), COMBINED TYPE: Primary | ICD-10-CM

## 2025-03-19 DIAGNOSIS — Z79.899 HIGH RISK MEDICATION USE: ICD-10-CM

## 2025-03-19 DIAGNOSIS — F41.1 GAD (GENERALIZED ANXIETY DISORDER): ICD-10-CM

## 2025-03-19 DIAGNOSIS — F43.10 POST TRAUMATIC STRESS DISORDER (PTSD): ICD-10-CM

## 2025-03-19 DIAGNOSIS — F31.30 BIPOLAR I DISORDER, MOST RECENT EPISODE DEPRESSED: ICD-10-CM

## 2025-03-19 RX ORDER — LISDEXAMFETAMINE DIMESYLATE 20 MG/1
20 CAPSULE ORAL EVERY MORNING
Qty: 30 CAPSULE | Refills: 0 | Status: SHIPPED | OUTPATIENT
Start: 2025-03-19 | End: 2025-03-20 | Stop reason: SDUPTHER

## 2025-03-19 RX ORDER — LUMATEPERONE 42 MG/1
1 CAPSULE ORAL
Qty: 30 CAPSULE | Refills: 2 | Status: SHIPPED | OUTPATIENT
Start: 2025-03-19 | End: 2025-03-21

## 2025-03-19 NOTE — PROGRESS NOTES
"     Office  Follow Up Visit      Patient Name: Anurag Esparza  : 1995   MRN: [id number]     Referring Provider: Karen Ibarra APRN    Chief Complaint:  \"management of bipolar disorder, ALICIA, PTSD, and ADHD\"      ICD-10-CM ICD-9-CM   1. ADHD (attention deficit hyperactivity disorder), combined type  F90.2 314.01   2. ALICIA (generalized anxiety disorder)  F41.1 300.02   3. Post traumatic stress disorder (PTSD)  F43.10 309.81   4. Bipolar I disorder, most recent episode depressed  F31.30 296.50   5. High risk medication use  Z79.899 V58.69        History of Present Illness:   Anurag Esparza is a 29 y.o. male presenting for a routine follow up appointment for psychiatric medication management.        He expresses satisfaction with Caplyta manging symptoms. He was concerned about potential appetite increase and weight gain but has since adjusted his eating habits and reports a minimal weight increase from 245 to 248 pounds, which he considers acceptable. His mood has generally improved, with increased happiness and less frequent depressive episodes. However, he acknowledges a challenging week that has slightly elevated his PHQ-9/ALICIA-7 scores. He continues to experience anxiety, although it is less severe than before. He is currently working with a therapist to manage this. He reports feeling stable and content with his current medication regimen.    We have previously discussed the possibility of restarting a stimulant for his ADHD symptoms. He recalls undergoing testing in [place] under the supervision of a  but has lost the documentation due to a car break-in. He has contacted [facility] but they do not have any records of his testing. He is open to undergoing testing again. He reports spending excessive time on his phone watching YouTube shorts, often neglecting other responsibilities. He struggles with task completion and lacks motivation, a problem that has been progressively worsening " "over the past 3 to 4 months. This issue extends to his work, where he has several unfinished projects due to procrastination. He recently faced potential disciplinary action at work for failing to rebuild an environment that was due a year ago.  He has previously tried Vyvanse and Adderall, with Adderall being more effective during his younger years.     He reports fewer \"manic episodes\" compared to his childhood, with the severity varying depending on the situation. He describes childhood manic episodes as \"behavioral outbursts or anger outbursts.\" He has a complex history of childhood trauma. His mood has been stable on Caplyta alone. He tolerated stimulants in the past.    MEDICATIONS  Current: Caplyta  Past: Vyvanse, Adderall     Subjective      Review of Systems:   Ears, Nose, Mouth, & Throat: Denies difficulty hearing, smelling, sinus problems, sore throat, or dentition.  Heart/Vascular: Denies rapid heart rate, chest pain, swelling of feet or legs   Respiratory: Denies shortness of breath, cough, or wheeze  GI/: Denies nausea, vomiting, constipation, diarrhea, abdominal pain, painful urination, frequent urination  MSK: Denies swelling or joint deformities  Skin: Denies lesions or rash  Neurologic: Denies headaches, dizziness, or tremor   Endocrine: Denies heat or cold intolerance   Hematologic: Denies easy bruising or bleeding  Psychiatric: Endorses improvement in anxiety; denies insomnia, irritability, depression, recurrent bad thoughts, mood swings, hallucinations, compulsions    PHQ-9 Depression Screening  Little interest or pleasure in doing things? Nearly every day   Feeling down, depressed, or hopeless? More than half the days   PHQ-2 Total Score 5   Trouble falling or staying asleep, or sleeping too much? Not at all   Feeling tired or having little energy? More than half the days   Poor appetite or overeating? More than half the days   Feeling bad about yourself - or that you are a failure or have " let yourself or your family down? More than half the days   Trouble concentrating on things, such as reading the newspaper or watching television? Nearly every day   Moving or speaking so slowly that other people could have noticed? Or the opposite - being so fidgety or restless that you have been moving around a lot more than usual? Not at all     Thoughts that you would be better off dead, or of hurting yourself in some way? Not at all   PHQ-9 Total Score 14   If you checked off any problems, how difficult have these problems made it for you to do your work, take care of things at home, or get along with other people? Very difficult         ALICIA-7      Over the last two weeks, how often have you been bothered by the following problems?  Feeling nervous, anxious or on edge: Several days  Not being able to stop or control worrying: Not at all  Worrying too much about different things: Several days  Trouble Relaxing: Not at all  Being so restless that it is hard to sit still: Several days  Becoming easily annoyed or irritable: More than half the days  Feeling afraid as if something awful might happen: More than half the days  ALICIA 7 Total Score: 7  If you checked any problems, how difficult have these problems made it for you to do your work, take care of things at home, or get along with other people: Somewhat difficult    Patient History:   The following portions of the patient's history were reviewed and updated as appropriate: allergies, current medications, past family history, past medical history, past social history, past surgical history and problem list.     Social History     Socioeconomic History    Marital status:      Spouse name: Rogelio    Number of children: 0    Highest education level: GED or equivalent   Tobacco Use    Smoking status: Never    Smokeless tobacco: Never   Vaping Use    Vaping status: Never Used   Substance and Sexual Activity    Alcohol use: Not Currently     Alcohol/week: 1.0  "standard drink of alcohol     Types: 1 Standard drinks or equivalent per week     Comment: SOICALLY    Drug use: Never    Sexual activity: Yes     Partners: Male     Birth control/protection: Partner of same sex       Family History   Problem Relation Age of Onset    Depression Mother         estranged    Anxiety disorder Mother     Alcohol abuse Mother     Depression Father     Anxiety disorder Father        Medications:     Current Outpatient Medications:     Lumateperone Tosylate (Caplyta) 42 MG capsule, Take 1 capsule by mouth every night at bedtime for 90 days. Lot # [id number] EXP: 3/2027, Disp: 30 capsule, Rfl: 2    amoxicillin-clavulanate (AUGMENTIN) 875-125 MG per tablet, Take 1 tablet by mouth 2 (Two) Times a Day., Disp: 20 tablet, Rfl: 0    azithromycin (ZITHROMAX) 250 MG tablet, Take 2 on first day. Take 1 a day for additional 4 days., Disp: 6 tablet, Rfl: 0    benzonatate (TESSALON) 200 MG capsule, Take 1 capsule by mouth 3 (Three) Times a Day As Needed for Cough., Disp: 15 capsule, Rfl: 0    ketoconazole (NIZORAL) 2 % cream, Apply 1 Application topically to the appropriate area as directed Daily., Disp: 15 g, Rfl: 1    lisdexamfetamine (Vyvanse) 20 MG capsule, Take 1 capsule by mouth Every Morning, Disp: 30 capsule, Rfl: 0    triamcinolone (KENALOG) 0.1 % ointment, Apply 1 Application topically to the appropriate area as directed 2 (Two) Times a Day., Disp: 15 g, Rfl: 1    Objective     Physical Exam:  Vital Signs:   Vitals:    03/19/25 0901   BP: 124/79   Pulse: 72   SpO2: 97%   Weight: 112 kg (248 lb)   Height: 175.3 cm (69.02\")     Body mass index is 36.61 kg/m².     Mental Status Exam:   The patient was alert and oriented to time, place, person, and situation. Neatly groomed and dressed. Good eye contact. Cooperative and answers questions willingly. No evidence of gait imbalance or shuffling. Normal speech rate, rhythm, and tone. Reports good/euthymic mood. Affect congruent with mood. Denies SI/HI/AVH. " Organized thought process. No evidence of delusional thinking. Good insight, good judgement. Impulse control intact. Memory intact with average to above average intellectual functioning.     No Known Allergies     Current Medications:   Current Outpatient Medications   Medication Sig Dispense Refill    Lumateperone Tosylate (Caplyta) 42 MG capsule Take 1 capsule by mouth every night at bedtime for 90 days. Lot # [id number] EXP: 3/2027 30 capsule 2    amoxicillin-clavulanate (AUGMENTIN) 875-125 MG per tablet Take 1 tablet by mouth 2 (Two) Times a Day. 20 tablet 0    azithromycin (ZITHROMAX) 250 MG tablet Take 2 on first day. Take 1 a day for additional 4 days. 6 tablet 0    benzonatate (TESSALON) 200 MG capsule Take 1 capsule by mouth 3 (Three) Times a Day As Needed for Cough. 15 capsule 0    ketoconazole (NIZORAL) 2 % cream Apply 1 Application topically to the appropriate area as directed Daily. 15 g 1    lisdexamfetamine (Vyvanse) 20 MG capsule Take 1 capsule by mouth Every Morning 30 capsule 0    triamcinolone (KENALOG) 0.1 % ointment Apply 1 Application topically to the appropriate area as directed 2 (Two) Times a Day. 15 g 1     No current facility-administered medications for this visit.       @RESULASTCBCDIFFPANEL,TSH,LABLIPI,UZAPYRKM94,KXPEZJUY33,MG,FOLATE,PROLACTIN,CRPRESULT,CMP,[id number])@    Lab Results   Component Value Date    GLUCOSE 93 03/04/2024    BUN 13 03/04/2024    CREATININE 0.82 03/04/2024    EGFR 122.7 03/04/2024    BCR 15.9 03/04/2024    K 4.4 03/04/2024    CO2 24.5 03/04/2024    CALCIUM 8.9 03/04/2024    ALBUMIN 4.5 03/04/2024    BILITOT 0.5 03/04/2024    AST 21 03/04/2024    ALT 26 03/04/2024       Lab Results   Component Value Date    WBC 10.22 03/04/2024    HGB 14.5 03/04/2024    HCT 43.9 03/04/2024    MCV 85.7 03/04/2024     03/04/2024       Lab Results   Component Value Date    CHLPL 202 (H) 03/04/2024    TRIG 129 03/04/2024    HDL 39 (L) 03/04/2024     (H) 03/04/2024                   Assessment / Plan      Impression/Treatment Plan:       1. Attention Deficit Hyperactivity Disorder (ADHD).  A daily regimen of Vyvanse 20 mg will be initiated. He received ADHD diagnosis in childhood and is advised to undergo neuropsychological testing to update record. List of psychologists in the area given to patient to schedule an appointment. The potential risk of inducing xavier has been discussed, given his history of bipolar disorder. He is instructed to report any symptoms indicative of xavier. A urine drug screen will be conducted today. If such symptoms arise, the medication will be discontinued.    2. Anxiety.  Anxiety levels are reported as stable but still noticeable. No changes to the current management plan were discussed.    3. Bipolar Disorder.  Mood is generally stable with occasional down periods, particularly during stressful weeks. No significant manic episodes have been reported recently. Continued monitoring is recommended. Continue Caplyta 42 mg daily.    Follow-up  The patient will follow up in 1 month or sooner as needed.       Diagnoses and all orders for this visit:    1. ADHD (attention deficit hyperactivity disorder), combined type (Primary)  -     lisdexamfetamine (Vyvanse) 20 MG capsule; Take 1 capsule by mouth Every Morning  Dispense: 30 capsule; Refill: 0  -     ToxAssure Flex 15, Ur -    2. ALICIA (generalized anxiety disorder)  -     Lumateperone Tosylate (Caplyta) 42 MG capsule; Take 1 capsule by mouth every night at bedtime for 90 days. Lot # [id number] EXP: 3/2027  Dispense: 30 capsule; Refill: 2    3. Post traumatic stress disorder (PTSD)  -     Lumateperone Tosylate (Caplyta) 42 MG capsule; Take 1 capsule by mouth every night at bedtime for 90 days. Lot # [id number] EXP: 3/2027  Dispense: 30 capsule; Refill: 2    4. Bipolar I disorder, most recent episode depressed  -     Lumateperone Tosylate (Caplyta) 42 MG capsule; Take 1 capsule by mouth every night at bedtime for 90  days. Lot # [id number] EXP: 3/2027  Dispense: 30 capsule; Refill: 2    5. High risk medication use  -     ToxAssure Flex 15, Ur -        MEDS ORDERED DURING VISIT:  New Medications Ordered This Visit   Medications    Lumateperone Tosylate (Caplyta) 42 MG capsule     Sig: Take 1 capsule by mouth every night at bedtime for 90 days. Lot # [id number] EXP: 3/2027     Dispense:  30 capsule     Refill:  2     Lot Number?:   [id number]     Expiration Date?:   3/1/2027     Quantity:   4    lisdexamfetamine (Vyvanse) 20 MG capsule     Sig: Take 1 capsule by mouth Every Morning     Dispense:  30 capsule     Refill:  0       Follow up with Yulia Figueroa, DNP, APRN, PMHNP-BC in this office in one month or sooner as needed.          PATIENT EDUCATION:  Discussed medication options and treatment plan of prescribed medication(s) as well as the risks, benefits, and potential side effects. Patient is agreeable to call the office with any worsening of symptoms or onset of side effects. Patient is agreeable to call 911 or go to the nearest ER should he/she begin having SI/HI.    SAFETY PLAN:  Patient was given ample time for questions and fully participated in treatment planning.  Patient was encouraged to call the clinic with any questions or concerns.  Patient was informed of access to emergency care. If patient were to develop any significant symptomatology, suicidal ideation, homicidal ideation, any concerns, or feel unsafe at any time they are to call the clinic and if unable to get immediate assistance should immediately call 911 or go to the nearest emergency room.  The patient is advised to remove or secure (lock away) all lethal weapons (including guns) and sharps (including razors, scissors, knives, etc.).  All medications (including any prescribed and any over the counter medications) should be stored in a safe and secured location that is not obtainable by children/adolescents.  Patient was given an opportunity and encouraged to  ask questions about their medication, illness, and treatment. Patient contracted verbally for the following: If you are experiencing an emotional crisis or have thoughts of harming yourself or others, please go to your nearest local emergency room or call 911. Will continue to re-assess medication response and side effects frequently to establish efficacy and ensure safety. Risks, any black box warnings, side effects, off label usage, and benefits of medication and treatment discussed with patient, along with potential adverse side effects of current and/or newly prescribed medication, alternative treatment options, and OTC medications.  Patient verbalized understanding of potential risks, any off label use of medication, any black box warnings, and any side effects in their own words. The patient verbalized understanding and agreed to comply with the safety plan discussed in their own words.  Patient given the number to the office. Number also available to the 24- hour suicide hotline.     Short Term Goals: Continue building rapport with the patient. Patient will be compliant with medication, and patient will have no significant medication related side effects.  Patient will be engaged in psychotherapy as indicated.  Patient will report subjective improvement of symptoms.     Long term goals: To stabilize mood and treat/improve subjective symptoms, the patient will stay out of the hospital, the patient will be at an optimal level of functioning, and the patient will take all medications as prescribed.     Poli reviewed and is appropriate.    Patient or patient representative verbalized consent for the use of Ambient Listening during the visit with  DAVID Kerns for chart documentation. 3/19/2025  11:12 EDT       Copied text in this note has been reviewed and is accurate as of 3/19/2025.    Part of this note may be an electronic transcription/translation of spoken language to printed text using the  Dragon Dictation System.    Yulia Figueroa, DNP, APRN, PMHNP-BC

## 2025-03-20 DIAGNOSIS — F90.2 ADHD (ATTENTION DEFICIT HYPERACTIVITY DISORDER), COMBINED TYPE: ICD-10-CM

## 2025-03-20 DIAGNOSIS — F43.10 POST TRAUMATIC STRESS DISORDER (PTSD): ICD-10-CM

## 2025-03-20 DIAGNOSIS — F41.1 GAD (GENERALIZED ANXIETY DISORDER): ICD-10-CM

## 2025-03-20 DIAGNOSIS — F31.30 BIPOLAR I DISORDER, MOST RECENT EPISODE DEPRESSED: ICD-10-CM

## 2025-03-20 RX ORDER — LUMATEPERONE 42 MG/1
1 CAPSULE ORAL
Qty: 30 CAPSULE | Refills: 2 | Status: CANCELLED | OUTPATIENT
Start: 2025-03-20 | End: 2025-06-18

## 2025-03-20 NOTE — TELEPHONE ENCOUNTER
Rx Refill Note  Requested Prescriptions     Pending Prescriptions Disp Refills    lisdexamfetamine (Vyvanse) 20 MG capsule 30 capsule 0     Sig: Take 1 capsule by mouth Every Morning    Lumateperone Tosylate (Caplyta) 42 MG capsule 30 capsule 2     Sig: Take 1 capsule by mouth every night at bedtime for 90 days. Lot # [id number] EXP: 3/2027      Last office visit with prescribing clinician: 3/19/2025   Last telemedicine visit with prescribing clinician: Visit date not found   Next office visit with prescribing clinician: 4/22/2025     Armand Mcgowan MA  03/20/25, 15:18 EDT       Patient called and changed his pharmacy, if these medications can be sent to [facility].

## 2025-03-21 RX ORDER — LISDEXAMFETAMINE DIMESYLATE 20 MG/1
20 CAPSULE ORAL EVERY MORNING
Qty: 30 CAPSULE | Refills: 0 | Status: SHIPPED | OUTPATIENT
Start: 2025-03-21

## 2025-04-02 ENCOUNTER — TELEPHONE (OUTPATIENT)
Age: 30
End: 2025-04-02
Payer: COMMERCIAL

## 2025-04-02 NOTE — TELEPHONE ENCOUNTER
Insurance company called the office and said that the patient would need a PA for vyvanse.    ID#[id number]  Website [email or web address]

## 2025-04-03 LAB
1OH-MIDAZOLAM UR QL SCN: NOT DETECTED NG/MG CREAT
6MAM UR QL SCN: NEGATIVE NG/ML
7AMINOCLONAZEPAM/CREAT UR: NOT DETECTED NG/MG CREAT
A-OH ALPRAZ/CREAT UR: NOT DETECTED NG/MG CREAT
A-OH-TRIAZOLAM/CREAT UR CFM: NOT DETECTED NG/MG CREAT
ALPRAZ/CREAT UR CFM: NOT DETECTED NG/MG CREAT
AMPHETAMINES UR QL SCN: NEGATIVE NG/ML
BARBITURATES UR QL SCN: NEGATIVE NG/ML
BENZODIAZ SCN METH UR: NEGATIVE
BUPRENORPHINE UR QL SCN: NEGATIVE
BUPRENORPHINE/CREAT UR: NOT DETECTED NG/MG CREAT
CANNABINOIDS UR QL SCN: NEGATIVE NG/ML
CLONAZEPAM/CREAT UR CFM: NOT DETECTED NG/MG CREAT
COCAINE+BZE UR QL SCN: NEGATIVE NG/ML
CREAT UR-MCNC: 153 MG/DL
DESALKYLFLURAZ/CREAT UR: NOT DETECTED NG/MG CREAT
DIAZEPAM/CREAT UR: NOT DETECTED NG/MG CREAT
ETHANOL UR QL SCN: NEGATIVE G/DL
FENTANYL CTO UR SCN-MCNC: NEGATIVE NG/ML
FENTANYL/CREAT UR: NOT DETECTED NG/MG CREAT
FLUNITRAZEPAM UR QL SCN: NOT DETECTED NG/MG CREAT
LORAZEPAM/CREAT UR: NOT DETECTED NG/MG CREAT
METHADONE UR QL SCN: NEGATIVE NG/ML
METHADONE+METAB UR QL SCN: NEGATIVE NG/ML
MIDAZOLAM/CREAT UR CFM: NOT DETECTED NG/MG CREAT
NORBUPRENORPHINE/CREAT UR: NOT DETECTED NG/MG CREAT
NORDIAZEPAM/CREAT UR: NOT DETECTED NG/MG CREAT
NORFENTANYL/CREAT UR: NOT DETECTED NG/MG CREAT
NORFLUNITRAZEPAM UR-MCNC: NOT DETECTED NG/MG CREAT
OPIATES UR SCN-MCNC: NEGATIVE NG/ML
OXAZEPAM/CREAT UR: NOT DETECTED NG/MG CREAT
OXYCODONE CTO UR SCN-MCNC: NEGATIVE NG/ML
PCP UR QL SCN: NEGATIVE NG/ML
PRESCRIBED MEDICATIONS: NORMAL
TAPENTADOL CTO UR SCN-MCNC: NEGATIVE NG/ML
TEMAZEPAM/CREAT UR: NOT DETECTED NG/MG CREAT
TRAMADOL UR QL SCN: NEGATIVE NG/ML

## 2025-04-08 ENCOUNTER — TELEPHONE (OUTPATIENT)
Age: 30
End: 2025-04-08
Payer: COMMERCIAL

## 2025-04-08 DIAGNOSIS — F90.2 ADHD (ATTENTION DEFICIT HYPERACTIVITY DISORDER), COMBINED TYPE: Primary | ICD-10-CM

## 2025-04-08 DIAGNOSIS — F31.81 BIPOLAR II DISORDER: ICD-10-CM

## 2025-04-08 NOTE — TELEPHONE ENCOUNTER
Patient called and stated that the cost is too high for Vyvanse even with the PA, he asked if their is another medication that he can try. Would like for you to give him a call, please advise!

## 2025-04-09 RX ORDER — METHYLPHENIDATE HYDROCHLORIDE 18 MG/1
18 TABLET ORAL EVERY MORNING
Qty: 14 TABLET | Refills: 0 | Status: SHIPPED | OUTPATIENT
Start: 2025-04-09 | End: 2025-04-09

## 2025-04-09 RX ORDER — METHYLPHENIDATE HYDROCHLORIDE 18 MG/1
18 TABLET ORAL DAILY
Qty: 14 TABLET | Refills: 0 | Status: SHIPPED | OUTPATIENT
Start: 2025-04-09 | End: 2025-04-10

## 2025-04-09 NOTE — TELEPHONE ENCOUNTER
Spoke to the patient on the phone regarding inability to obtain Vyvanse due to shortage. Discussed alternative medication, Concerta 18 mg daily. Patient agrees with this plan. Poli report reviewed and appropriate. Will provide 14 day supply. Follow up appointment 4/22/2025. Prescription sent to [facility] as requested.    4/10/25 Spoke to the patient on the phone about cost of Concerta. The patient would like to consider a different option due to inability to maintain prescription at quoted cost from pharmacy. He also has not received his Caplyta prescription due to a disconnect in PA process. Will send prescriptions for Adderall XR 10 mg daily x 14 days and Caplyta 42 mg x 30 days to [facility] pharmacy as requested. Poli report reviewed today and appropriate. The patient agrees with this plan.

## 2025-04-10 DIAGNOSIS — F43.10 POST TRAUMATIC STRESS DISORDER (PTSD): ICD-10-CM

## 2025-04-10 DIAGNOSIS — F90.2 ADHD (ATTENTION DEFICIT HYPERACTIVITY DISORDER), COMBINED TYPE: Primary | ICD-10-CM

## 2025-04-10 DIAGNOSIS — F31.81 BIPOLAR II DISORDER: ICD-10-CM

## 2025-04-10 RX ORDER — DEXTROAMPHETAMINE SACCHARATE, AMPHETAMINE ASPARTATE MONOHYDRATE, DEXTROAMPHETAMINE SULFATE AND AMPHETAMINE SULFATE 2.5; 2.5; 2.5; 2.5 MG/1; MG/1; MG/1; MG/1
10 CAPSULE, EXTENDED RELEASE ORAL DAILY
Qty: 30 CAPSULE | Refills: 0 | Status: SHIPPED | OUTPATIENT
Start: 2025-04-10 | End: 2026-04-10

## 2025-04-21 ENCOUNTER — TELEPHONE (OUTPATIENT)
Age: 30
End: 2025-04-21
Payer: COMMERCIAL

## 2025-04-22 ENCOUNTER — TELEMEDICINE (OUTPATIENT)
Age: 30
End: 2025-04-22
Payer: COMMERCIAL

## 2025-04-22 DIAGNOSIS — F90.2 ADHD (ATTENTION DEFICIT HYPERACTIVITY DISORDER), COMBINED TYPE: Primary | ICD-10-CM

## 2025-04-22 DIAGNOSIS — F43.10 POST TRAUMATIC STRESS DISORDER (PTSD): ICD-10-CM

## 2025-04-22 DIAGNOSIS — F39 MOOD DISORDER: ICD-10-CM

## 2025-04-22 DIAGNOSIS — F41.1 GAD (GENERALIZED ANXIETY DISORDER): ICD-10-CM

## 2025-04-22 NOTE — PROGRESS NOTES
"     Office  Follow Up Visit      Patient Name: Anurag Esparza  : 1995   MRN: [id number]     Referring Provider: Karen Ibarra APRN    Chief Complaint:  \"management of ADHD, autism, PTSD, ALICIA, and depression\"      ICD-10-CM ICD-9-CM   1. ADHD (attention deficit hyperactivity disorder), combined type  F90.2 314.01   2. Post traumatic stress disorder (PTSD)  F43.10 309.81   3. ALICIA (generalized anxiety disorder)  F41.1 300.02   4. Mood disorder  F39 296.90        History of Present Illness:   Anurag Esparza is a 30 y.o. male presenting for a routine follow up appointment for psychiatric medication management.        The patient is a 30-year-old male who presents via virtual visit for evaluation of autism, severe complex PTSD, depression, anxiety, and ADHD. This appointment is via telehealth with the patient's [place] [address] and the provider located [place] [address].    He recently underwent neuropsychological testing and reports that he has been diagnosed with autism, ADHD, cPTSD, ALICIA, and depression. The report will be faxed to the office. His ADHD is considered a subset of both his autism and depression. He has been experiencing difficulty in obtaining refills for Caplyta, with [facility] quoting a price of $700 and refusing to accept the 's card. He is uncertain if [facility] will accept the 's card or provide a discount.     He has successfully obtained his Adderall prescription, which he reports as beneficial. He reports an overall improvement in his mood and productivity since starting the Adderall. He notes that the effects of the Adderall, when taken at 8:00 AM, typically wear off around 2:00 PM or 3:00 PM. He has noticed a decrease in appetite since starting Adderall, which has led to episodes of shakiness. However, he is able to manage this by planning his meals accordingly. He has two remaining doses of Caplyta and reports " that he falls asleep within 2 to 4 hours of taking the medication.    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    MEDICATIONS  Current: Adderall, Caplyta     Subjective      Review of Systems:   Ears, Nose, Mouth, & Throat: Denies difficulty hearing, smelling, sinus problems, sore throat, or dentition.  Heart/Vascular: Denies rapid heart rate, chest pain, swelling of feet or legs   Respiratory: Denies shortness of breath, cough, or wheeze  GI/: Denies nausea, vomiting, constipation, diarrhea, abdominal pain, painful urination, frequent urination  MSK: Denies swelling or joint deformities  Skin: Denies lesions or rash  Neurologic: Denies headaches, dizziness, or tremor   Endocrine: Denies heat or cold intolerance   Hematologic: Denies easy bruising or bleeding  Psychiatric: Denies insomnia, irritability, depression, anxiety, recurrent bad thoughts, mood swings, hallucinations, compulsions    PHQ-9 Depression Screening  Little interest or pleasure in doing things?     Feeling down, depressed, or hopeless?     PHQ-2 Total Score     Trouble falling or staying asleep, or sleeping too much?     Feeling tired or having little energy?     Poor appetite or overeating?     Feeling bad about yourself - or that you are a failure or have let yourself or your family down?     Trouble concentrating on things, such as reading the newspaper or watching television?     Moving or speaking so slowly that other people could have noticed? Or the opposite - being so fidgety or restless that you have been moving around a lot more than usual?       Thoughts that you would be better off dead, or of hurting yourself in some way?     PHQ-9 Total Score     If you checked off any problems, how difficult have these problems made it for you to do your work, take care of things at home, or get along with other people?           ALICIA-7           Patient History:   The following portions of  the patient's history were reviewed and updated as appropriate: allergies, current medications, past family history, past medical history, past social history, past surgical history and problem list.     Social History     Socioeconomic History    Marital status:      Spouse name: Rogelio    Number of children: 0    Highest education level: GED or equivalent   Tobacco Use    Smoking status: Never    Smokeless tobacco: Never   Vaping Use    Vaping status: Never Used   Substance and Sexual Activity    Alcohol use: Not Currently     Alcohol/week: 1.0 standard drink of alcohol     Types: 1 Standard drinks or equivalent per week     Comment: SOICALLY    Drug use: Never    Sexual activity: Yes     Partners: Male     Birth control/protection: Partner of same sex       Family History   Problem Relation Age of Onset    Depression Mother         estranged    Anxiety disorder Mother     Alcohol abuse Mother     Depression Father     Anxiety disorder Father        Medications:     Current Outpatient Medications:     amoxicillin-clavulanate (AUGMENTIN) 875-125 MG per tablet, Take 1 tablet by mouth 2 (Two) Times a Day., Disp: 20 tablet, Rfl: 0    amphetamine-dextroamphetamine XR (Adderall XR) 10 MG 24 hr capsule, Take 1 capsule by mouth Daily, Disp: 30 capsule, Rfl: 0    azithromycin (ZITHROMAX) 250 MG tablet, Take 2 on first day. Take 1 a day for additional 4 days., Disp: 6 tablet, Rfl: 0    benzonatate (TESSALON) 200 MG capsule, Take 1 capsule by mouth 3 (Three) Times a Day As Needed for Cough., Disp: 15 capsule, Rfl: 0    ketoconazole (NIZORAL) 2 % cream, Apply 1 Application topically to the appropriate area as directed Daily., Disp: 15 g, Rfl: 1    Lumateperone Tosylate 42 MG capsule, Take 1 capsule by mouth Daily for 90 days., Disp: 30 capsule, Rfl: 2    triamcinolone (KENALOG) 0.1 % ointment, Apply 1 Application topically to the appropriate area as directed 2 (Two) Times a Day., Disp: 15 g, Rfl: 1    Objective      Physical Exam:  Vital Signs: There were no vitals filed for this visit.  There is no height or weight on file to calculate BMI.     Mental Status Exam:   The patient was alert and oriented to time, place, person, and situation. Neatly groomed and dressed. Good eye contact. Cooperative and answers questions willingly. No evidence of gait imbalance or shuffling. Normal speech rate, rhythm, and tone. Reports good mood. Affect congruent with mood. Denies SI/HI/AVH. Organized thought process. No evidence of delusional thinking. Good insight, good judgement. Impulse control intact. Memory intact with average to above average intellectual functioning.     No Known Allergies     Current Medications:   Current Outpatient Medications   Medication Sig Dispense Refill    amoxicillin-clavulanate (AUGMENTIN) 875-125 MG per tablet Take 1 tablet by mouth 2 (Two) Times a Day. 20 tablet 0    amphetamine-dextroamphetamine XR (Adderall XR) 10 MG 24 hr capsule Take 1 capsule by mouth Daily 30 capsule 0    azithromycin (ZITHROMAX) 250 MG tablet Take 2 on first day. Take 1 a day for additional 4 days. 6 tablet 0    benzonatate (TESSALON) 200 MG capsule Take 1 capsule by mouth 3 (Three) Times a Day As Needed for Cough. 15 capsule 0    ketoconazole (NIZORAL) 2 % cream Apply 1 Application topically to the appropriate area as directed Daily. 15 g 1    Lumateperone Tosylate 42 MG capsule Take 1 capsule by mouth Daily for 90 days. 30 capsule 2    triamcinolone (KENALOG) 0.1 % ointment Apply 1 Application topically to the appropriate area as directed 2 (Two) Times a Day. 15 g 1     No current facility-administered medications for this visit.       @RESULASTCBCDIFFPANEL,TSH,LABLIPI,[id number],ACISTIIY37,MG,FOLATE,PROLACTIN,CRPRESULT,CMP,[id number])@    Lab Results   Component Value Date    GLUCOSE 93 03/04/2024    BUN 13 03/04/2024    CREATININE 0.82 03/04/2024    EGFR 122.7 03/04/2024    BCR 15.9 03/04/2024    K 4.4 03/04/2024    CO2 24.5  03/04/2024    CALCIUM 8.9 03/04/2024    ALBUMIN 4.5 03/04/2024    BILITOT 0.5 03/04/2024    AST 21 03/04/2024    ALT 26 03/04/2024       Lab Results   Component Value Date    WBC 10.22 03/04/2024    HGB 14.5 03/04/2024    HCT 43.9 03/04/2024    MCV 85.7 03/04/2024     03/04/2024       Lab Results   Component Value Date    CHLPL 202 (H) 03/04/2024    TRIG 129 03/04/2024    HDL 39 (L) 03/04/2024     (H) 03/04/2024                  Assessment / Plan      Impression/Treatment Plan:       Problems:  - Autism  - Complex PTSD  - Depression  - Anxiety  - Attention Deficit Hyperactivity Disorder (ADHD)    Content of Therapy:  During the session, the patient discussed his recent diagnosis of autism and complex PTSD, as well as ongoing management of depression, anxiety, and ADHD. He shared his experiences with medication, including difficulties obtaining Caplyta and the positive effects of Adderall on his daily functioning. The patient discussed strategies for managing indecisiveness related to autism. The patient also mentioned feeling nervous about an upcoming job interview.    Clinical Impression:  The patient presents with a complex mental health profile, including autism, severe complex PTSD, depression, anxiety, and ADHD. He reports improved mood and increased productivity with his current medication regimen, particularly Adderall. Despite challenges in obtaining Caplyta, the patient remains stable and is open to adjusting his treatment plan. His insight into his conditions and willingness to follow therapeutic recommendations are positive indicators for continued progress.    Therapeutic Intervention:  - Cognitive-behavioral strategies to reframe thoughts and manage anxiety  - Psychoeducation on the overlap of ADHD, anxiety, and depression symptoms  - Discussion on the potential effects of stimulants and antidepressants  - Exploration of feelings related to recent diagnoses and medication  management    Plan:  - Increase Adderall XR dosage to 20 mg daily by taking two 10 mg XR tablets starting tomorrow. When current prescription runs out, call office for refill with new dose.  - Discontinue Caplyta; consider adding an antidepressant if the patient experiences a slump in mood or increased anxiety  - Continue using strategies to manage indecisiveness related to autism  - Differential: Bipolar Disorder. Monitor for any signs of xavier induced by medication changes  - Self-care instructions: Maintain a regular sleep schedule and plan meals to address appetite changes    Follow-up:  - Follow-up appointment in 1 month  - Patient to call when he runs out of his current Adderall prescription for an early refill due to dose increase today.    Notes & Risk Factors:  - No current risk factors for harm to self or others reported  - Protective factors include the patient's insight into his conditions, willingness to follow therapeutic recommendations, and support from his partner       Diagnoses and all orders for this visit:    1. ADHD (attention deficit hyperactivity disorder), combined type (Primary)    2. Post traumatic stress disorder (PTSD)    3. ALICIA (generalized anxiety disorder)    4. Mood disorder        MEDS ORDERED DURING VISIT:  No orders of the defined types were placed in this encounter.    PATIENT EDUCATION:  Discussed medication options and treatment plan of prescribed medication(s) as well as the risks, benefits, and potential side effects. Patient is agreeable to call the office with any worsening of symptoms or onset of side effects. Patient is agreeable to call 911 or go to the nearest ER should he/she begin having SI/HI.    SAFETY PLAN:  Patient was given ample time for questions and fully participated in treatment planning.  Patient was encouraged to call the clinic with any questions or concerns.  Patient was informed of access to emergency care. If patient were to develop any significant  symptomatology, suicidal ideation, homicidal ideation, any concerns, or feel unsafe at any time they are to call the clinic and if unable to get immediate assistance should immediately call 911 or go to the nearest emergency room.  The patient is advised to remove or secure (lock away) all lethal weapons (including guns) and sharps (including razors, scissors, knives, etc.).  All medications (including any prescribed and any over the counter medications) should be stored in a safe and secured location that is not obtainable by children/adolescents.  Patient was given an opportunity and encouraged to ask questions about their medication, illness, and treatment. Patient contracted verbally for the following: If you are experiencing an emotional crisis or have thoughts of harming yourself or others, please go to your nearest local emergency room or call 911. Will continue to re-assess medication response and side effects frequently to establish efficacy and ensure safety. Risks, any black box warnings, side effects, off label usage, and benefits of medication and treatment discussed with patient, along with potential adverse side effects of current and/or newly prescribed medication, alternative treatment options, and OTC medications.  Patient verbalized understanding of potential risks, any off label use of medication, any black box warnings, and any side effects in their own words. The patient verbalized understanding and agreed to comply with the safety plan discussed in their own words.  Patient given the number to the office. Number also available to the 24- hour suicide hotline.     Short Term Goals: Continue building rapport with the patient. Patient will be compliant with medication, and patient will have no significant medication related side effects.  Patient will be engaged in psychotherapy as indicated.  Patient will report subjective improvement of symptoms.     Long term goals: To stabilize mood and  treat/improve subjective symptoms, the patient will stay out of the hospital, the patient will be at an optimal level of functioning, and the patient will take all medications as prescribed.     Poli reviewed and is appropriate.    Patient or patient representative verbalized consent for the use of Ambient Listening during the visit with  DAVID Kerns for chart documentation. 4/22/2025  10:57 EDT       Copied text in this note has been reviewed and is accurate as of 4/22/2025.    Part of this note may be an electronic transcription/translation of spoken language to printed text using the Dragon Dictation System.    Yulia Figueroa DNP, DAVID, PMHNP-BC

## 2025-04-29 ENCOUNTER — TELEPHONE (OUTPATIENT)
Age: 30
End: 2025-04-29
Payer: COMMERCIAL

## 2025-04-29 DIAGNOSIS — F90.2 ADHD (ATTENTION DEFICIT HYPERACTIVITY DISORDER), COMBINED TYPE: Primary | ICD-10-CM

## 2025-04-29 RX ORDER — DEXTROAMPHETAMINE SACCHARATE, AMPHETAMINE ASPARTATE MONOHYDRATE, DEXTROAMPHETAMINE SULFATE AND AMPHETAMINE SULFATE 5; 5; 5; 5 MG/1; MG/1; MG/1; MG/1
20 CAPSULE, EXTENDED RELEASE ORAL DAILY
Qty: 30 CAPSULE | Refills: 0 | Status: SHIPPED | OUTPATIENT
Start: 2025-04-29 | End: 2026-04-29

## 2025-04-29 NOTE — TELEPHONE ENCOUNTER
Poli reviewed and appropriate. Dose increase discussed during last appointment. Patient was instructed to take two Adderall XR 10 mg for a total of 20 mg daily until prescription ran out.

## 2025-04-29 NOTE — TELEPHONE ENCOUNTER
Patient called in stated that he is wanting a refill Adderall increas to 20mg instead of 10mg sent to the pharmacy on file.

## 2025-06-03 ENCOUNTER — TELEMEDICINE (OUTPATIENT)
Age: 30
End: 2025-06-03
Payer: COMMERCIAL

## 2025-06-03 DIAGNOSIS — F41.1 GAD (GENERALIZED ANXIETY DISORDER): ICD-10-CM

## 2025-06-03 DIAGNOSIS — F43.10 POST TRAUMATIC STRESS DISORDER (PTSD): ICD-10-CM

## 2025-06-03 DIAGNOSIS — F33.1 MDD (MAJOR DEPRESSIVE DISORDER), RECURRENT EPISODE, MODERATE: ICD-10-CM

## 2025-06-03 DIAGNOSIS — F90.2 ADHD (ATTENTION DEFICIT HYPERACTIVITY DISORDER), COMBINED TYPE: Primary | ICD-10-CM

## 2025-06-03 RX ORDER — DEXTROAMPHETAMINE SACCHARATE, AMPHETAMINE ASPARTATE MONOHYDRATE, DEXTROAMPHETAMINE SULFATE AND AMPHETAMINE SULFATE 5; 5; 5; 5 MG/1; MG/1; MG/1; MG/1
20 CAPSULE, EXTENDED RELEASE ORAL DAILY
Qty: 30 CAPSULE | Refills: 0 | Status: SHIPPED | OUTPATIENT
Start: 2025-06-03 | End: 2026-06-03

## 2025-06-03 NOTE — PROGRESS NOTES
Subjective   Anurag Esparza is a 30 y.o. male who presents today in follow up for management of ADHD, combined type, generalized anxiety disorder, major depressive disorder and PTSD. Today's visit was contacted via telehealth with the patient being located at his home residence at [address] [place] [address].  This provider was located at the [facility] clinic located at [address] [place] [address].    Patient reports that he is doing well overall but does endorse the persistence of various ADHD symptoms despite recent increase in Adderall XR dosage.  More specifically, patient reports ongoing issues initiating and completing necessary tasks, increased forgetfulness, increased frequency of procrastination, frequent avoidance of nonpreferred responsibilities, difficulties directing/sustaining his attention/focus, and issues with general organization.  Patient states that he did experience a rather significant therapeutic response upon initiation of Adderall XR, but does feel as if current dosage of his medication is no longer is efficacious has originally perceived.  While he does report an overall improvement ADHD symptoms various aspects of his social and occupational functioning remain negatively impacted due to the persistence of symptoms detailed above.  Patient does otherwise deny a depressed mood or any other significant depressive symptoms including any low self worth, feelings of hopelessness or any active suicidal ideation, intent or plan.  Patient does report high levels of anxiety but endorses being able to appropriately manage/control his anxiety and worry.  Patient does continue to experience recurrent flashbacks of past traumas in addition to high levels of arousal and hypervigilance.  He reports consistent compliance with his prescribed Adderall, denies any side effects including any insomnia, increased levels of  anxiety/restlessness or a decreased appetite, and appears to be tolerating this medication well.  Patient otherwise denies any auditory, visual, or tactile hallucinations and does not currently appear to be responding to internal stimuli.  Patient denies any generalized paranoia and displays no evidence of delusional thinking.    The following portions of the patient's history were reviewed and updated as appropriate: allergies, current medications, past family history, past medical history, past social history, past surgical history and problem list.  History of Present Illness               Past Medical History:  Past Medical History:   Diagnosis Date    ADHD (attention deficit hyperactivity disorder) 2001    Anxiety 2006    Asperger syndrome     Age 18    Depression 2006    H/O psychiatric hospitalization     [facility]    History of medical problems Trichotillomania, pilonidal cyst, aspbergers    JHOAN (obstructive sleep apnea)     PTSD (post-traumatic stress disorder)     Suicide attempt     Age 12 & 16       Social History:  Social History     Socioeconomic History    Marital status:      Spouse name: Rogelio    Number of children: 0    Highest education level: GED or equivalent   Tobacco Use    Smoking status: Never     Passive exposure: Never    Smokeless tobacco: Never   Vaping Use    Vaping status: Never Used   Substance and Sexual Activity    Alcohol use: Not Currently     Alcohol/week: 1.0 standard drink of alcohol     Types: 1 Standard drinks or equivalent per week    Drug use: Never    Sexual activity: Yes     Partners: Male     Birth control/protection: Partner of same sex       Family History:  Family History   Problem Relation Age of Onset    Depression Mother         estranged    Anxiety disorder Mother     Alcohol abuse Mother     Depression Father     Anxiety disorder Father     No Known Problems Sister     No Known Problems Maternal Grandmother     No Known Problems  Maternal Grandfather     No Known Problems Paternal Grandmother     No Known Problems Paternal Grandfather        Past Surgical History:  Past Surgical History:   Procedure Laterality Date    CYST REMOVAL      pilonidal       Problem List:  Patient Active Problem List   Diagnosis    Anxiety    Hypersomnia with sleep apnea    Class 2 severe obesity due to excess calories with serious comorbidity and body mass index (BMI) of 36.0 to 36.9 in adult    MDD (major depressive disorder), recurrent episode, moderate    Asperger syndrome    ALICIA (generalized anxiety disorder)    Mood disorder    Concentration deficit    Post traumatic stress disorder (PTSD)    Bipolar I disorder, most recent episode depressed    Insomnia due to other mental disorder    ADHD (attention deficit hyperactivity disorder), combined type       Allergy:   No Known Allergies     Current Medications:   Current Outpatient Medications   Medication Sig Dispense Refill    amphetamine-dextroamphetamine XR (ADDERALL XR) 20 MG 24 hr capsule Take 1 capsule by mouth Daily 30 capsule 0    amoxicillin-clavulanate (AUGMENTIN) 875-125 MG per tablet Take 1 tablet by mouth 2 (Two) Times a Day. 20 tablet 0    azithromycin (Zithromax Z-Stuart) 250 MG tablet Take 2 tablets by mouth on day 1, then 1 tablet daily on days 2-5 6 tablet 0    benzonatate (TESSALON) 200 MG capsule Take 1 capsule by mouth 3 (Three) Times a Day As Needed for Cough. 15 capsule 0    ketoconazole (NIZORAL) 2 % cream Apply 1 Application topically to the appropriate area as directed Daily. 15 g 1    Lumateperone Tosylate 42 MG capsule Take 1 capsule by mouth Daily for 90 days. 30 capsule 2    methylPREDNISolone (MEDROL) 4 MG dose pack Take as directed on package instructions. 21 tablet 0    triamcinolone (KENALOG) 0.1 % ointment Apply 1 Application topically to the appropriate area as directed 2 (Two) Times a Day. 15 g 1     No current facility-administered medications for this visit.       Review of  Symptoms:    Review of Systems   Constitutional:  Positive for fatigue. Negative for activity change.   HENT:  Negative for tinnitus.    Eyes:  Negative for visual disturbance.   Endocrine: Negative for cold intolerance and heat intolerance.   Skin:  Negative for rash.   Neurological:  Negative for seizures and confusion.   Psychiatric/Behavioral:  Positive for decreased concentration. Negative for hallucinations, self-injury, suicidal ideas and depressed mood. The patient is not nervous/anxious.          Physical Exam:   There were no vitals taken for this visit.  Appearance: Appears documented age, appropriate hygiene and grooming.  Gait, Station, Strength: Appropriate gait, station and strength.  Physical Exam               Mental Status Exam:   Hygiene:   good  Cooperation:  Cooperative  Eye Contact:  Good  Psychomotor Behavior:  Appropriate  Affect:  Appropriate and Restricted  Mood: normal and euthymic  Hopelessness: Denies  Speech:  Normal  Thought Process:  Goal directed and Linear  Thought Content:  Normal  Suicidal:  None  Homicidal:  None  Hallucinations:  None  Delusion:  None  Memory:  Intact  Orientation:  Person, Place, Time, and Situation  Reliability:  good  Insight:  Good  Judgement:  Good  Impulse Control:  Good      Lab Results:   Admission on 06/02/2025, Discharged on 06/02/2025   Component Date Value Ref Range Status    COVID19 06/02/2025 Not Detected   Corrected    Influenza A Antigen MIKAELA 06/02/2025 Not Detected   Corrected    Influenza B Antigen MIKAELA 06/02/2025 Not Detected   Corrected    Internal Control 06/02/2025 Passed   Corrected    Lot Number 06/02/2025 [id number]   Final    Expiration Date 06/02/2025 03.05.26   Final    Rapid Strep A Screen 06/02/2025 Negative   Final    Internal Control 06/02/2025 Passed   Final    Lot Number 06/02/2025 [id number]   Final    Expiration Date 06/02/2025 04/03/27   Final   Admission on 05/22/2025, Discharged on 05/22/2025   Component Date Value Ref Range  Status    COVID19 05/22/2025 Not Detected   Final    Influenza A Antigen MIKAELA 05/22/2025 Not Detected   Final    Influenza B Antigen MIKAELA 05/22/2025 Not Detected   Final    Internal Control 05/22/2025 Passed   Final    Lot Number 05/22/2025 [id number]   Final    Expiration Date 05/22/2025 03.05.26   Final    Rapid Strep A Screen 05/22/2025 Negative   Final    Internal Control 05/22/2025 Passed   Final    Lot Number 05/22/2025 [id number]   Final    Expiration Date 05/22/2025 04/03/27   Final    Color 05/22/2025 Orange (A)   Final-Edited    Clarity, UA 05/22/2025 Clear   Final-Edited    Glucose, UA 05/22/2025 Negative  mg/dL Final-Edited    Bilirubin 05/22/2025 Negative   Final-Edited    Ketones, UA 05/22/2025 Negative   Final-Edited    Specific Gravity  05/22/2025 1.025  1.005 - 1.030 Final-Edited    Blood, UA 05/22/2025 Negative   Final-Edited    pH, Urine 05/22/2025 6.5  5.0 - 8.0 Final-Edited    Protein, POC 05/22/2025 100 mg/dL (A)  mg/dL Final-Edited    Urobilinogen, UA 05/22/2025 1 E.U./dL (A)   Final-Edited    Nitrite, UA 05/22/2025 Negative   Final-Edited    Leukocytes 05/22/2025 Negative   Final-Edited     Results            PHQ-9 Total Score:     ALICIA-7 Total Score:      Assessment & Plan    Diagnoses and all orders for this visit:    1. ADHD (attention deficit hyperactivity disorder), combined type (Primary)  -     amphetamine-dextroamphetamine XR (ADDERALL XR) 20 MG 24 hr capsule; Take 1 capsule by mouth Daily  Dispense: 30 capsule; Refill: 0    2. Post traumatic stress disorder (PTSD)    3. ALICIA (generalized anxiety disorder)    4. MDD (major depressive disorder), recurrent episode, moderate         Anurag Esparza is a 30 y.o. male who presents today in follow up for management of ADHD, combined type, generalized anxiety disorder, major depressive disorder and PTSD.  Today's visit was contacted via telehealth with the patient being located at his home residence at [address] [place] [address].  This  provider was located at the [facility] clinic located at [address] [place], [place] [address] [id number].    As detailed above patient appears to be doing well overall but continues to experience various ADHD symptoms despite recent increase in daily dosage of Adderall XR.  Patient reports consistent compliance with this medication, denies any side effects as detailed above and appears to be tolerating this medication well.  While the patient does report a rather robust therapeutic benefit associated with the initiation of Adderall XR several months ago he does feel as if current dosage of his medication seems less efficacious when compared to its initiation.  I do feel as if the patient would benefit from further increasing daily dosage of Adderall XR from 20 mg to 30 mg p.o. once daily at this time, but we will hold off on making any medication adjustments at this time as patient does voice his desire to continue his psychiatric care with previous psychiatric provider, Yulia Figueroa, and is scheduled for a follow-up appointment with this provider on 6/19/2025.  I will defer any medication adjustments/changes to treatment plan to this provider at this time.  A 30-day prescription for Adderall XR 20 mg will be sent to patient's pharmacy at today's visit.  A follow-up appointment is not necessary at this time.  Patient voices understanding of this and is agreeable to today's plan.    Medications:  -Continue Adderall XR 20 mg p.o. once daily for management of ADHD, combined type.      TREATMENT PLAN - SHORT AND LONG-TERM GOALS:   -Continue supportive psychotherapy efforts and medications as indicated. Treatment and medication options discussed during today's visit.   -Patient acknowledged and verbally consented to continue with current treatment plan and was educated on the importance of compliance with treatment and follow-up  appointments.    SUMMARY/EDUCATION/DISCUSSION:  -Pt was given appropriate time to ask questions and concerns were addressed. A thorough discussion was had that included review of disease process, need for continued monitoring and additional treatment options including use of pharmacological and non-pharmacological approaches to care, decisions were made and agreed upon by patient and provider.   -Discussed medication options and treatment plan of prescribed medication as well as the risks, benefits, and side effects including potential falls, possible impaired driving and metabolic adversities among others; patient acknowledged and provided verbal consent.   -Patient has been educated regarding multimodal approach with healthy nutrition, healthy sleep, regular physical activity, social activities, counseling, and medications.  -Please call the office at [phone] within normal business hours (Monday-Friday, 8:00 AM - 4:30 PM) with any worsening of symptoms or onset of intolerable side effects. Please ask to leave a message with office staff.  Please allow up to 24-48 hours for response to a patient call/question/refill request.  -Safety plan has been established and discussed in detail with the patient, who is agreeable to contact support system and/or call 911 or go to the nearest ER should he/she/they have any thoughts of harm to self or others.    MEDS ORDERED DURING VISIT:  New Medications Ordered This Visit   Medications    amphetamine-dextroamphetamine XR (ADDERALL XR) 20 MG 24 hr capsule     Sig: Take 1 capsule by mouth Daily     Dispense:  30 capsule     Refill:  0       FOLLOW UP:  No follow-ups on file.      Lamin South DO    This document has been electronically signed by Lamin South DO  Elda 3, 2025 09:34 EDT    Part of this note may be an electronic transcription/translation of spoken language to printed text using the Dragon Dictation System. Some of the data in this electronic  note has been brought forward from a previous encounter, any necessary changes have been made, it has been reviewed by this provider, and it is accurate.

## 2025-06-26 ENCOUNTER — OFFICE VISIT (OUTPATIENT)
Dept: FAMILY MEDICINE CLINIC | Facility: CLINIC | Age: 30
End: 2025-06-26
Payer: COMMERCIAL

## 2025-06-26 VITALS
BODY MASS INDEX: 35.4 KG/M2 | SYSTOLIC BLOOD PRESSURE: 124 MMHG | OXYGEN SATURATION: 97 % | RESPIRATION RATE: 18 BRPM | HEIGHT: 69 IN | WEIGHT: 239 LBS | HEART RATE: 103 BPM | DIASTOLIC BLOOD PRESSURE: 84 MMHG

## 2025-06-26 DIAGNOSIS — G47.33 OSA (OBSTRUCTIVE SLEEP APNEA): ICD-10-CM

## 2025-06-26 DIAGNOSIS — R09.89 THROAT CLEARING: ICD-10-CM

## 2025-06-26 DIAGNOSIS — H65.192 ACUTE EFFUSION OF LEFT EAR: Primary | ICD-10-CM

## 2025-06-26 DIAGNOSIS — R09.A2 GLOBUS SENSATION: ICD-10-CM

## 2025-06-26 PROCEDURE — 99214 OFFICE O/P EST MOD 30 MIN: CPT | Performed by: NURSE PRACTITIONER

## 2025-06-26 RX ORDER — AZELASTINE 1 MG/ML
2 SPRAY, METERED NASAL 2 TIMES DAILY
Qty: 30 ML | Refills: 3 | Status: SHIPPED | OUTPATIENT
Start: 2025-06-26

## 2025-06-26 RX ORDER — LORATADINE 10 MG/1
10 TABLET ORAL DAILY
Qty: 30 TABLET | Refills: 1 | Status: SHIPPED | OUTPATIENT
Start: 2025-06-26

## 2025-06-26 RX ORDER — OMEPRAZOLE 20 MG/1
20 CAPSULE, DELAYED RELEASE ORAL DAILY
Qty: 30 CAPSULE | Refills: 1 | Status: SHIPPED | OUTPATIENT
Start: 2025-06-26

## 2025-06-26 RX ORDER — SERTRALINE HYDROCHLORIDE 25 MG/1
TABLET, FILM COATED ORAL
COMMUNITY
Start: 2025-06-18

## 2025-06-26 NOTE — PROGRESS NOTES
Wiliam Esparza is a 30 y.o. male.     History of Present Illness     The following portions of the patient's history were reviewed and updated as appropriate: allergies, current medications, past family history, past medical history, past social history, past surgical history and problem list.       The patient is a 30-year-old male who presents for evaluation of dysphagia, elevated blood pressure, and ear pressure.    He has been experiencing sensation of something in throat and throat clearing, a symptom that has persisted since his last visit to urgent care on 06/02/2025. He reports a sensation of an obstruction in his throat but does not experience any associated pain. No fever, no swelling.  No known history of GERD. He has not attempted treatment with Pepcid or Prilosec.  He occasionally experiences a regurgitation of food taste a few hours post-meal, particularly when clearing his throat. He does not experience any respiratory distress, except for nasal breathing difficulties due to sinus congestion. His voice has been affected, with the sensation in his throat becoming more pronounced during speech. Despite these symptoms, he continues to consume food without choking incidents.     He was seen at urgent care on 05/22/2025 for a viral infection and was treated with steroids and cough syrup. He returned to urgent care on 06/02/2025 with persistent coughing and difficulty swallowing solids. An x-ray suggested atelectasis, he was treated for bronchitis with a Z-Stuart and steroid pack. He was advised to visit the ER for his throat issue but did not go.    He has sleep apnea and uses a CPAP machine that covers his whole mouth, but he cannot use it when he is congested . He still has issues and feels congestion sometimes, which even Mucinex does not clear up. He still has the throat feeling and will not use the CPAP machine if he does not feel good. He is snoring.    Concerns about blood pressure. No  history of hypertension. He recalls his blood pressure being borderline during his last physical examination. He is currently on Adderall and Zoloft.  He has been mindful of his caffeine intake, limiting it to 300 mg per day. He has been consuming larger portions of food less frequently and reports snacking. He expresses a desire to increase his physical activity and work on dietary changes     SOCIAL HISTORY  He does not drink alcohol very often.         Review of Systems        Current Outpatient Medications:     amphetamine-dextroamphetamine XR (ADDERALL XR) 20 MG 24 hr capsule, Take 1 capsule by mouth Daily, Disp: 30 capsule, Rfl: 0    ketoconazole (NIZORAL) 2 % cream, Apply 1 Application topically to the appropriate area as directed Daily., Disp: 15 g, Rfl: 1    sertraline (ZOLOFT) 25 MG tablet, , Disp: , Rfl:     triamcinolone (KENALOG) 0.1 % ointment, Apply 1 Application topically to the appropriate area as directed 2 (Two) Times a Day., Disp: 15 g, Rfl: 1    azelastine (ASTELIN) 0.1 % nasal spray, Administer 2 sprays into the nostril(s) as directed by provider 2 (Two) Times a Day. Use in each nostril as directed, Disp: 30 mL, Rfl: 3    loratadine (Claritin) 10 MG tablet, Take 1 tablet by mouth Daily., Disp: 30 tablet, Rfl: 1    omeprazole (priLOSEC) 20 MG capsule, Take 1 capsule by mouth Daily. 30 MIN BEFORE 1ST MEAL, Disp: 30 capsule, Rfl: 1    Objective   Physical Exam  Vitals reviewed.   Constitutional:       Appearance: Normal appearance. He is obese.   HENT:      Right Ear: Tympanic membrane normal.      Left Ear: No decreased hearing noted. No drainage, swelling or tenderness. A middle ear effusion is present.      Nose: Nose normal.      Mouth/Throat:      Mouth: Mucous membranes are moist.      Pharynx: No oropharyngeal exudate or posterior oropharyngeal erythema.   Eyes:      Pupils: Pupils are equal, round, and reactive to light.   Cardiovascular:      Rate and Rhythm: Normal rate and regular  rhythm.      Pulses: Normal pulses.      Heart sounds: Normal heart sounds.   Pulmonary:      Effort: Pulmonary effort is normal.      Breath sounds: Normal breath sounds.   Abdominal:      General: Bowel sounds are normal.   Musculoskeletal:         General: Normal range of motion.   Lymphadenopathy:      Cervical: No cervical adenopathy.   Neurological:      Mental Status: He is alert.         Vitals:    06/26/25 1358   BP: 124/84   Pulse:    Resp:    SpO2:      Body mass index is 35.29 kg/m².    Procedures    TSH   Date Value Ref Range Status   03/04/2024 2.040 0.270 - 4.200 uIU/mL Final     CBC   Date Value Ref Range Status   05/20/2021 1.70 (H) 0.4 - 1.0 10*3/uL Final                   Assessment & Plan   Problems Addressed this Visit    None  Visit Diagnoses         Acute effusion of left ear    -  Primary    Relevant Orders    Ambulatory Referral to ENT (Otolaryngology) (Completed)      Globus sensation          Throat clearing          JHOAN (obstructive sleep apnea)              Diagnoses         Codes Comments      Acute effusion of left ear    -  Primary ICD-10-CM: H65.192  ICD-9-CM: 381.00       Globus sensation     ICD-10-CM: R09.A2  ICD-9-CM: 784.99       Throat clearing     ICD-10-CM: R09.89  ICD-9-CM: 786.09       JHOAN (obstructive sleep apnea)     ICD-10-CM: G47.33  ICD-9-CM: 327.23                1. Globus sensation.  - Reports a sensation of something in the throat without pain, potentially due to acid reflux.  - Physical exam shows the back of the throat looks normal.  - Discussed starting omeprazole 20 mg daily, taken 30 minutes before the first meal for 2 weeks.  - Refer ENT, ER of severe symptoms  - Advised to be mindful of caffeine and acidic foods that can irritate the esophagus.    2. Elevated blood pressure.  - Blood pressure was initially elevated but normalized during the visit.  - Blood pressure normalized during the visit.  - Discussed the potential impact of Adderall on blood pressure  and advised monitoring caffeine intake and managing stress levels.  - low salt diet, walk daily    3. Ear pressure.  - Fluid present in the left ear, likely due to eustachian tube dysfunction following illness.  - Physical exam shows clear fluid in the left ear.  - Discussed using a nasal spray, 2 sprays in each nostril twice daily, and taking an antihistamine like Claritin or Zyrtec daily for a couple of weeks.  - Referral to an ENT specialist has been made for further evaluation.    4. Sleep apnea.  - Not currently using CPAP machine due to congestion and throat issues.  - Patient reports congestion and sensation in the throat preventing CPAP use.  - Discussed the importance of using the CPAP machine and advised resuming its use once symptoms improve.  - Advised to manage congestion with nasal spray and antihistamines to facilitate CPAP use.  - work on diet and exercise, enc wt loss              Education provided in AVS   Return if symptoms worsen or fail to improve.    Patient or patient representative verbalized consent for the use of Ambient Listening during the visit with  DAVID Gutierres for chart documentation. 6/26/2025  14:00 EDT